# Patient Record
Sex: MALE | Race: WHITE | NOT HISPANIC OR LATINO | URBAN - METROPOLITAN AREA
[De-identification: names, ages, dates, MRNs, and addresses within clinical notes are randomized per-mention and may not be internally consistent; named-entity substitution may affect disease eponyms.]

---

## 2017-12-09 ENCOUNTER — EMERGENCY (EMERGENCY)
Facility: HOSPITAL | Age: 52
LOS: 1 days | Discharge: HOME | End: 2017-12-09
Attending: EMERGENCY MEDICINE | Admitting: EMERGENCY MEDICINE
Payer: COMMERCIAL

## 2017-12-09 VITALS
DIASTOLIC BLOOD PRESSURE: 106 MMHG | TEMPERATURE: 98 F | HEART RATE: 93 BPM | OXYGEN SATURATION: 100 % | SYSTOLIC BLOOD PRESSURE: 146 MMHG | RESPIRATION RATE: 16 BRPM

## 2017-12-09 LAB
ALBUMIN SERPL ELPH-MCNC: 4.4 G/DL — SIGNIFICANT CHANGE UP (ref 3.3–5)
ALP SERPL-CCNC: 67 U/L — SIGNIFICANT CHANGE UP (ref 40–120)
ALT FLD-CCNC: 19 U/L — SIGNIFICANT CHANGE UP (ref 4–41)
AST SERPL-CCNC: 16 U/L — SIGNIFICANT CHANGE UP (ref 4–40)
BASOPHILS # BLD AUTO: 0.11 K/UL — SIGNIFICANT CHANGE UP (ref 0–0.2)
BASOPHILS NFR BLD AUTO: 1.2 % — SIGNIFICANT CHANGE UP (ref 0–2)
BILIRUB SERPL-MCNC: 0.3 MG/DL — SIGNIFICANT CHANGE UP (ref 0.2–1.2)
BUN SERPL-MCNC: 12 MG/DL — SIGNIFICANT CHANGE UP (ref 7–23)
CALCIUM SERPL-MCNC: 9.3 MG/DL — SIGNIFICANT CHANGE UP (ref 8.4–10.5)
CHLORIDE SERPL-SCNC: 102 MMOL/L — SIGNIFICANT CHANGE UP (ref 98–107)
CK MB BLD-MCNC: 1.24 NG/ML — SIGNIFICANT CHANGE UP (ref 1–6.6)
CK MB BLD-MCNC: 1.49 NG/ML — SIGNIFICANT CHANGE UP (ref 1–6.6)
CK MB BLD-MCNC: SIGNIFICANT CHANGE UP (ref 0–2.5)
CK MB BLD-MCNC: SIGNIFICANT CHANGE UP (ref 0–2.5)
CK SERPL-CCNC: 101 U/L — SIGNIFICANT CHANGE UP (ref 30–200)
CK SERPL-CCNC: 114 U/L — SIGNIFICANT CHANGE UP (ref 30–200)
CO2 SERPL-SCNC: 27 MMOL/L — SIGNIFICANT CHANGE UP (ref 22–31)
CREAT SERPL-MCNC: 1.14 MG/DL — SIGNIFICANT CHANGE UP (ref 0.5–1.3)
EOSINOPHIL # BLD AUTO: 0.13 K/UL — SIGNIFICANT CHANGE UP (ref 0–0.5)
EOSINOPHIL NFR BLD AUTO: 1.4 % — SIGNIFICANT CHANGE UP (ref 0–6)
GLUCOSE SERPL-MCNC: 89 MG/DL — SIGNIFICANT CHANGE UP (ref 70–99)
HCT VFR BLD CALC: 42.8 % — SIGNIFICANT CHANGE UP (ref 39–50)
HGB BLD-MCNC: 14.5 G/DL — SIGNIFICANT CHANGE UP (ref 13–17)
IMM GRANULOCYTES # BLD AUTO: 0.02 # — SIGNIFICANT CHANGE UP
IMM GRANULOCYTES NFR BLD AUTO: 0.2 % — SIGNIFICANT CHANGE UP (ref 0–1.5)
LYMPHOCYTES # BLD AUTO: 2.26 K/UL — SIGNIFICANT CHANGE UP (ref 1–3.3)
LYMPHOCYTES # BLD AUTO: 24.1 % — SIGNIFICANT CHANGE UP (ref 13–44)
MCHC RBC-ENTMCNC: 30 PG — SIGNIFICANT CHANGE UP (ref 27–34)
MCHC RBC-ENTMCNC: 33.9 % — SIGNIFICANT CHANGE UP (ref 32–36)
MCV RBC AUTO: 88.6 FL — SIGNIFICANT CHANGE UP (ref 80–100)
MONOCYTES # BLD AUTO: 0.69 K/UL — SIGNIFICANT CHANGE UP (ref 0–0.9)
MONOCYTES NFR BLD AUTO: 7.4 % — SIGNIFICANT CHANGE UP (ref 2–14)
NEUTROPHILS # BLD AUTO: 6.16 K/UL — SIGNIFICANT CHANGE UP (ref 1.8–7.4)
NEUTROPHILS NFR BLD AUTO: 65.7 % — SIGNIFICANT CHANGE UP (ref 43–77)
NRBC # FLD: 0 — SIGNIFICANT CHANGE UP
PLATELET # BLD AUTO: 228 K/UL — SIGNIFICANT CHANGE UP (ref 150–400)
PMV BLD: 10.6 FL — SIGNIFICANT CHANGE UP (ref 7–13)
POTASSIUM SERPL-MCNC: 4.3 MMOL/L — SIGNIFICANT CHANGE UP (ref 3.5–5.3)
POTASSIUM SERPL-SCNC: 4.3 MMOL/L — SIGNIFICANT CHANGE UP (ref 3.5–5.3)
PROT SERPL-MCNC: 6.9 G/DL — SIGNIFICANT CHANGE UP (ref 6–8.3)
RBC # BLD: 4.83 M/UL — SIGNIFICANT CHANGE UP (ref 4.2–5.8)
RBC # FLD: 12.1 % — SIGNIFICANT CHANGE UP (ref 10.3–14.5)
SODIUM SERPL-SCNC: 141 MMOL/L — SIGNIFICANT CHANGE UP (ref 135–145)
TROPONIN T SERPL-MCNC: < 0.06 NG/ML — SIGNIFICANT CHANGE UP (ref 0–0.06)
TROPONIN T SERPL-MCNC: < 0.06 NG/ML — SIGNIFICANT CHANGE UP (ref 0–0.06)
WBC # BLD: 9.37 K/UL — SIGNIFICANT CHANGE UP (ref 3.8–10.5)
WBC # FLD AUTO: 9.37 K/UL — SIGNIFICANT CHANGE UP (ref 3.8–10.5)

## 2017-12-09 PROCEDURE — 71020: CPT | Mod: 26

## 2017-12-09 PROCEDURE — 99220: CPT

## 2017-12-09 RX ORDER — ASPIRIN/CALCIUM CARB/MAGNESIUM 324 MG
162 TABLET ORAL ONCE
Qty: 0 | Refills: 0 | Status: COMPLETED | OUTPATIENT
Start: 2017-12-09 | End: 2017-12-09

## 2017-12-09 RX ORDER — FAMOTIDINE 10 MG/ML
20 INJECTION INTRAVENOUS ONCE
Qty: 0 | Refills: 0 | Status: COMPLETED | OUTPATIENT
Start: 2017-12-09 | End: 2017-12-09

## 2017-12-09 RX ADMIN — Medication 162 MILLIGRAM(S): at 15:01

## 2017-12-09 RX ADMIN — FAMOTIDINE 20 MILLIGRAM(S): 10 INJECTION INTRAVENOUS at 15:01

## 2017-12-09 NOTE — ED CDU PROVIDER INITIAL DAY NOTE - PROGRESS NOTE DETAILS
Pt signed out to me by ЮЛИЯ Washington and Dr. Colbert to follow up CE x 2 and stress in am. PT resting comfortably at bedside, no current complaints. will continue to monitor.

## 2017-12-09 NOTE — ED PROVIDER NOTE - ATTENDING CONTRIBUTION TO CARE
Dr. Arnold: This H&P has been written by myself in its entirety  Dr. Arnold: I have personally performed a face to face bedside history and physical examination of this patient. I have discussed the history, examination, review of systems, assessment and plan of management with the resident. I have reviewed the electronic medical record and amended it to reflect my history, review of systems, physical exam, assessment and plan.

## 2017-12-09 NOTE — ED PROVIDER NOTE - OBJECTIVE STATEMENT
Silvana Crespo M.D: 52yoM hx HTN, HLDintermittent smoker, GERD, p/w 3 days of burning sensation through chest, which today feels sharp in the left side of chest. +mild SOB +burping. thinks it may be his GERD, but he was worried given a significant family history of heart diseasebefore age of 50, decided to get checked out. denies abd pain, n/v/c/d, diaphoresis. Silvana Crespo M.D: 52yoM hx HTN, HLDintermittent smoker, GERD, p/w 3 days of burning sensation through chest, which today feels sharp in the left side of chest. +mild SOB +burping. thinks it may be his GERD, but he was worried given a significant family history of heart diseasebefore age of 50, decided to get checked out. denies abd pain, n/v/c/d, diaphoresis.    Dr. Arnold: 52M h/o HTN, HLD, smoker, GERD, +fam h/o MI (father in 50s) p/w 3 days of intermittent substernal chest pressure, non radiating, mild sob, +burping. does not feel like usual GERD. Last stress 3 years ago, showed ?LVH

## 2017-12-09 NOTE — ED CDU PROVIDER INITIAL DAY NOTE - OBJECTIVE STATEMENT
52 yr old male ex smoker presents with 3 days of constant chest pain.  States takes prilosec for GERD but this is not so much burning.  States burping made it feel better.  Denies SOB, radiation of pain.  Denies fever, cough.  Has significant family hx of father and uncles having MI's in 50s.  No recent cardiac workup.

## 2017-12-09 NOTE — ED PROVIDER NOTE - CONSULTING PHYSICIAN
Dr. Gao - called [phone], [phone] - unable to leave a [facility]

## 2017-12-09 NOTE — ED PROVIDER NOTE - PHYSICAL EXAMINATION
Silvana Crespo M.D.:   patient awake alert seen lying on stretcher NAD .   LUNGS CTAB no wheeze no crackle.   CARD RRR no m/r/g.    Abdomen soft NT ND no rebound no guarding no CVA tenderness.   EXT WWP no edema no calf tenderness CV 2+DP/PT bilaterally.   neuro A&Ox3 gait normal.    skin warm and dry no rash  HEENT: moist mucous membranes, PERRL, EOMI

## 2017-12-09 NOTE — ED PROVIDER NOTE - MEDICAL DECISION MAKING DETAILS
52yoM hx HTN HLD smoker positive family history p/w 3 days intermittent CP. likely GERD related, but given multiple risk factors, cannot rule out cardaic etiology. will check cxr ekg, cardiac labs and send to CDU for observation and further workup. 52yoM hx HTN HLD smoker positive family history p/w 3 days intermittent CP. likely GERD related, but given multiple risk factors, cannot rule out cardiac etiology. will check cxr ekg, cardiac labs and send to CDU for observation and further workup.

## 2017-12-09 NOTE — ED CDU PROVIDER INITIAL DAY NOTE - MEDICAL DECISION MAKING DETAILS
will get second set of Moose and stress test in AM; hx atypical for angina and EKG wnl; pt stable at this time with no complaints.

## 2017-12-09 NOTE — ED ADULT NURSE NOTE - OBJECTIVE STATEMENT
Pt presents to room 19, A&Ox3, ambulatory at baseline without assistance, coming in for evaluation of left sided chest pain, burping, and "gas" x 4 days. Pt has a hx of GERD and HTN. Denies any dizziness, nausea, vomiting, shortness of breath, palpitations, diarrhea, fever, constipation, or chills. has taken multiple antiacid medication with little relief.

## 2017-12-10 VITALS
RESPIRATION RATE: 16 BRPM | DIASTOLIC BLOOD PRESSURE: 84 MMHG | SYSTOLIC BLOOD PRESSURE: 124 MMHG | OXYGEN SATURATION: 100 % | TEMPERATURE: 98 F | HEART RATE: 82 BPM

## 2017-12-10 PROCEDURE — 93016 CV STRESS TEST SUPVJ ONLY: CPT | Mod: GC

## 2017-12-10 PROCEDURE — 93018 CV STRESS TEST I&R ONLY: CPT | Mod: GC

## 2017-12-10 PROCEDURE — 99217: CPT

## 2017-12-10 PROCEDURE — 78452 HT MUSCLE IMAGE SPECT MULT: CPT | Mod: 26

## 2017-12-10 NOTE — ED CDU PROVIDER SUBSEQUENT DAY NOTE - HISTORY
51 y.o male pmhx of GERD here with 3 days of chest pain unrelieved by antacids as it normally is, increased in belching. Pt has had 2 negative Moose, remained asymptomatic awaiting stress in am.

## 2017-12-10 NOTE — ED CDU PROVIDER SUBSEQUENT DAY NOTE - PROGRESS NOTE DETAILS
CDU ЮЛИЯ العلي: Pt signed out to me by ЮЛИЯ Watkins, 52yM w/pmhx HTN, HLD, GERD came to ED with 3 days of chest pain in the CDU for stress, CEx2 and tele, Ptw ith family history of father with MI in his 50s. Pt states his pain is much better but he does still feel an aches in the left chest. Will follow up on stress results. Pt was seen by cardiology who cleared him for discharge home, pt agrees to plan and will follow up with a cardiologist within 1-2 weeks, given cardiology referral list with  contact info

## 2017-12-10 NOTE — ED CDU PROVIDER DISPOSITION NOTE - ATTENDING CONTRIBUTION TO CARE
Dr. Crespo: I have discussed the plan with the PA and agree with the PA documentation unless stated otherwise.

## 2017-12-10 NOTE — PROGRESS NOTE ADULT - SUBJECTIVE AND OBJECTIVE BOX
52yoM hx HTN, HLD, smoker, GERD, p/w 3 days of burning sensation through chest, which today feels sharp in the left side of chest, +burping. He thinks it may be his GERD, but he  was worried given a significant family history of heart disease before age of 50.    Nuclear Stress Test-Exercise     IMPRESSIONS:Abnormal Study  There is a small, moderate defect in proximal to mid  inferior wall that is fixed, suggestive of infarction with  minimal quinn-infarct ischemia.  * Post-stress gated wall motion analysis was performed  (LVEF = 54 %;LVEDV = 72 ml.)    52 Male with abnormal stress test.  Discussed results with Dr. Gomez   Patient can be discharged home.   f/u with Dr. Gomez in 2 weeks    continue ASA, statin, smoking cessation    Pain likely GERD?, continue PPI

## 2017-12-10 NOTE — ED CDU PROVIDER DISPOSITION NOTE - CLINICAL COURSE
pt with family h/o cad, presented to ED with atypical c/p. had neg serial trops. stress test showed ischemia. evaluated by cardiology and d/w dr. sim, who says safe for d/c with outpatient cards f/u. stared him on baby asa daily here. advised no strenuous exercise until seeing cardiology as outpatient.  The patient was given verbal and written discharge instructions. Specifically, instructions when to return to the ED and when to seek follow-up from their pcp was discussed. Any specialty follow-up was discussed, including how to make an appointment.  Instructions were discussed in simple, plain language and was understood by the patient. The patient understands that should their symptoms worsen or any new symptoms arise, they should return to the ED immediately for further evaluation.

## 2017-12-10 NOTE — ED CDU PROVIDER DISPOSITION NOTE - PLAN OF CARE
Follow up with your primary care doctor and cardiologist (referral list provided) within 48-72 hours. You can follow up with , information provided.   Show copies of your reports given to you.   Recommend Aspirin 81mg over the counter daily until further evaluation.    Take all of your other medications as previously prescribed.   Worsening or continued chest pain, shortness of breath, weakness,  or any other concerns return to ED.

## 2017-12-12 ENCOUNTER — APPOINTMENT (OUTPATIENT)
Dept: CARDIOLOGY | Facility: CLINIC | Age: 52
End: 2017-12-12
Payer: COMMERCIAL

## 2017-12-12 ENCOUNTER — NON-APPOINTMENT (OUTPATIENT)
Age: 52
End: 2017-12-12

## 2017-12-12 VITALS
HEART RATE: 76 BPM | BODY MASS INDEX: 29.08 KG/M2 | SYSTOLIC BLOOD PRESSURE: 118 MMHG | WEIGHT: 229 LBS | TEMPERATURE: 98.2 F | OXYGEN SATURATION: 96 % | DIASTOLIC BLOOD PRESSURE: 90 MMHG | HEIGHT: 74.5 IN

## 2017-12-12 VITALS — SYSTOLIC BLOOD PRESSURE: 102 MMHG | DIASTOLIC BLOOD PRESSURE: 82 MMHG

## 2017-12-12 DIAGNOSIS — Z87.01 PERSONAL HISTORY OF PNEUMONIA (RECURRENT): ICD-10-CM

## 2017-12-12 DIAGNOSIS — Z82.3 FAMILY HISTORY OF STROKE: ICD-10-CM

## 2017-12-12 DIAGNOSIS — Z80.0 FAMILY HISTORY OF MALIGNANT NEOPLASM OF DIGESTIVE ORGANS: ICD-10-CM

## 2017-12-12 DIAGNOSIS — Z82.49 FAMILY HISTORY OF ISCHEMIC HEART DISEASE AND OTHER DISEASES OF THE CIRCULATORY SYSTEM: ICD-10-CM

## 2017-12-12 PROCEDURE — 93000 ELECTROCARDIOGRAM COMPLETE: CPT

## 2017-12-12 PROCEDURE — 99245 OFF/OP CONSLTJ NEW/EST HI 55: CPT

## 2017-12-12 RX ORDER — OMEPRAZOLE 40 MG/1
40 CAPSULE, DELAYED RELEASE ORAL
Qty: 60 | Refills: 0 | Status: ACTIVE | COMMUNITY
Start: 2017-06-29

## 2017-12-12 RX ORDER — ACETAMINOPHEN AND CODEINE 300; 30 MG/1; MG/1
300-30 TABLET ORAL
Qty: 25 | Refills: 0 | Status: DISCONTINUED | COMMUNITY
Start: 2017-06-22 | End: 2017-12-12

## 2017-12-12 RX ORDER — ASPIRIN ENTERIC COATED TABLETS 81 MG 81 MG/1
81 TABLET, DELAYED RELEASE ORAL
Qty: 30 | Refills: 5 | Status: ACTIVE | COMMUNITY
Start: 2017-12-12

## 2017-12-18 ENCOUNTER — FORM ENCOUNTER (OUTPATIENT)
Age: 52
End: 2017-12-18

## 2017-12-19 ENCOUNTER — OUTPATIENT (OUTPATIENT)
Dept: OUTPATIENT SERVICES | Facility: HOSPITAL | Age: 52
LOS: 1 days | End: 2017-12-19
Payer: COMMERCIAL

## 2017-12-19 ENCOUNTER — APPOINTMENT (OUTPATIENT)
Dept: CARDIOLOGY | Facility: CLINIC | Age: 52
End: 2017-12-19

## 2017-12-19 ENCOUNTER — APPOINTMENT (OUTPATIENT)
Dept: CARDIOLOGY | Facility: CLINIC | Age: 52
End: 2017-12-19
Payer: COMMERCIAL

## 2017-12-19 DIAGNOSIS — R07.9 CHEST PAIN, UNSPECIFIED: ICD-10-CM

## 2017-12-19 DIAGNOSIS — G47.33 OBSTRUCTIVE SLEEP APNEA (ADULT) (PEDIATRIC): ICD-10-CM

## 2017-12-19 PROCEDURE — 75574 CT ANGIO HRT W/3D IMAGE: CPT

## 2017-12-19 PROCEDURE — 93306 TTE W/DOPPLER COMPLETE: CPT

## 2017-12-19 PROCEDURE — 75574 CT ANGIO HRT W/3D IMAGE: CPT | Mod: 26

## 2017-12-26 ENCOUNTER — APPOINTMENT (OUTPATIENT)
Dept: CARDIOLOGY | Facility: CLINIC | Age: 52
End: 2017-12-26
Payer: COMMERCIAL

## 2017-12-26 VITALS
DIASTOLIC BLOOD PRESSURE: 80 MMHG | WEIGHT: 229 LBS | SYSTOLIC BLOOD PRESSURE: 122 MMHG | HEIGHT: 74.5 IN | BODY MASS INDEX: 29.08 KG/M2

## 2017-12-26 PROCEDURE — 99215 OFFICE O/P EST HI 40 MIN: CPT

## 2017-12-28 ENCOUNTER — OUTPATIENT (OUTPATIENT)
Dept: OUTPATIENT SERVICES | Facility: HOSPITAL | Age: 52
LOS: 1 days | End: 2017-12-28
Payer: COMMERCIAL

## 2017-12-28 VITALS
RESPIRATION RATE: 18 BRPM | HEART RATE: 67 BPM | DIASTOLIC BLOOD PRESSURE: 74 MMHG | SYSTOLIC BLOOD PRESSURE: 121 MMHG | TEMPERATURE: 98 F | OXYGEN SATURATION: 96 %

## 2017-12-28 DIAGNOSIS — I25.9 CHRONIC ISCHEMIC HEART DISEASE, UNSPECIFIED: ICD-10-CM

## 2017-12-28 LAB
ALBUMIN SERPL ELPH-MCNC: 4.3 G/DL — SIGNIFICANT CHANGE UP (ref 3.3–5)
ALP SERPL-CCNC: 73 U/L — SIGNIFICANT CHANGE UP (ref 40–120)
ALT FLD-CCNC: 31 U/L RC — SIGNIFICANT CHANGE UP (ref 10–45)
ANION GAP SERPL CALC-SCNC: 11 MMOL/L — SIGNIFICANT CHANGE UP (ref 5–17)
AST SERPL-CCNC: 18 U/L — SIGNIFICANT CHANGE UP (ref 10–40)
BILIRUB SERPL-MCNC: 0.4 MG/DL — SIGNIFICANT CHANGE UP (ref 0.2–1.2)
BUN SERPL-MCNC: 16 MG/DL — SIGNIFICANT CHANGE UP (ref 7–23)
CALCIUM SERPL-MCNC: 9.7 MG/DL — SIGNIFICANT CHANGE UP (ref 8.4–10.5)
CHLORIDE SERPL-SCNC: 104 MMOL/L — SIGNIFICANT CHANGE UP (ref 96–108)
CO2 SERPL-SCNC: 27 MMOL/L — SIGNIFICANT CHANGE UP (ref 22–31)
CREAT SERPL-MCNC: 1.2 MG/DL — SIGNIFICANT CHANGE UP (ref 0.5–1.3)
GLUCOSE SERPL-MCNC: 105 MG/DL — HIGH (ref 70–99)
HCT VFR BLD CALC: 45.4 % — SIGNIFICANT CHANGE UP (ref 39–50)
HGB BLD-MCNC: 16.1 G/DL — SIGNIFICANT CHANGE UP (ref 13–17)
MCHC RBC-ENTMCNC: 33.1 PG — SIGNIFICANT CHANGE UP (ref 27–34)
MCHC RBC-ENTMCNC: 35.5 GM/DL — SIGNIFICANT CHANGE UP (ref 32–36)
MCV RBC AUTO: 93.1 FL — SIGNIFICANT CHANGE UP (ref 80–100)
PLATELET # BLD AUTO: 228 K/UL — SIGNIFICANT CHANGE UP (ref 150–400)
POTASSIUM SERPL-MCNC: 4.8 MMOL/L — SIGNIFICANT CHANGE UP (ref 3.5–5.3)
POTASSIUM SERPL-SCNC: 4.8 MMOL/L — SIGNIFICANT CHANGE UP (ref 3.5–5.3)
PROT SERPL-MCNC: 7.2 G/DL — SIGNIFICANT CHANGE UP (ref 6–8.3)
RBC # BLD: 4.87 M/UL — SIGNIFICANT CHANGE UP (ref 4.2–5.8)
RBC # FLD: 11.8 % — SIGNIFICANT CHANGE UP (ref 10.3–14.5)
SODIUM SERPL-SCNC: 142 MMOL/L — SIGNIFICANT CHANGE UP (ref 135–145)
WBC # BLD: 8 K/UL — SIGNIFICANT CHANGE UP (ref 3.8–10.5)
WBC # FLD AUTO: 8 K/UL — SIGNIFICANT CHANGE UP (ref 3.8–10.5)

## 2017-12-28 PROCEDURE — C1894: CPT

## 2017-12-28 PROCEDURE — 92978 ENDOLUMINL IVUS OCT C 1ST: CPT | Mod: 26,LM

## 2017-12-28 PROCEDURE — C1887: CPT

## 2017-12-28 PROCEDURE — 85027 COMPLETE CBC AUTOMATED: CPT

## 2017-12-28 PROCEDURE — 93010 ELECTROCARDIOGRAM REPORT: CPT

## 2017-12-28 PROCEDURE — 93005 ELECTROCARDIOGRAM TRACING: CPT

## 2017-12-28 PROCEDURE — 80053 COMPREHEN METABOLIC PANEL: CPT

## 2017-12-28 PROCEDURE — C1753: CPT

## 2017-12-28 PROCEDURE — 92978 ENDOLUMINL IVUS OCT C 1ST: CPT

## 2017-12-28 PROCEDURE — 93458 L HRT ARTERY/VENTRICLE ANGIO: CPT

## 2017-12-28 PROCEDURE — 93458 L HRT ARTERY/VENTRICLE ANGIO: CPT | Mod: 26

## 2017-12-28 PROCEDURE — C1769: CPT

## 2017-12-28 PROCEDURE — 99152 MOD SED SAME PHYS/QHP 5/>YRS: CPT

## 2017-12-28 PROCEDURE — 99153 MOD SED SAME PHYS/QHP EA: CPT

## 2017-12-28 NOTE — H&P CARDIOLOGY - PMH
Gastroesophageal reflux disease, esophagitis presence not specified    HTN (Hypertension)    Hyperlipidemia, unspecified hyperlipidemia type    LOAN (obstructive sleep apnea)

## 2017-12-28 NOTE — H&P CARDIOLOGY - HISTORY OF PRESENT ILLNESS
51 y/o  male, former cigarette smoker x 6PY with PMHx of HTN, HLD, GERD and LOAN - not on CPAP who was evaluated in the ED @ [facility] on 10/9 2/2 c/o 4 days of left sided CP.  Patient describes pain as piercing and states that he noted increased dyspnea when working out those 4 days.  In the ED Cardiac Biomarkers negative x 2 sets, EKG without acute changes.  Patient had Nuclear Stress - report below and was instructed to follow-up with Cardiology - Dr. Reynoso within the week.  He subsequently had CT Heart with Coronaries showing significant atherosclerosis - report below.  Patient is now for [facility] for which he presents today.      Nuclear Stress  MPRESSIONS:Abnormal Study  * There is a small, moderate defect in proximal to mid  inferior wall that is fixed, suggestive of infarction with  minimal quinn-infarct ischemia.  * Post-stress gated wall motion analysis was performed  (LVEF = 54 %;LVEDV = 72 ml.)    CT Heart with Coronaries  IMPRESSION:    1.  Coronary arteries:  LM:  severe (approximately 50%) mixed non-calcified and calcified plaque   distally   LAD:  mild (30-50%) mixed non-calcified and calcified plaque in the   proximal segment; moderate (approximately 50%) mixed non-calcified and   calcified plaque in the mid segment; mild (30-50%) non-calcified plaque   in the diagonal branch  LCX:  mild (30-50%) non-calcified plaque at the ostium; mild (30-50%)   mixed non-calcified and calcified plaque in the OM2 branch  RCA:  severe (>70%) mixed non-calcified and calcified plaque at the   ostium;  minimal (<30%) mixed non-calcified and calcified plaque in the   mid segment; has mild (30-50%) non-calcified plaque in the RPL branch  2.  Moderate coronary artery calcium (Agatston score 320) as delineated   above.  The observed calcium score is at the 95th percentile for age,   gender and race/ethnicity.  3.  Borderline dilated aortic root measuring 40.0 mm at the sinuses of   Valsalva.  4.  Mild to moderate non-calcified and calcified atherosclerotic plaque   in the visualized descending thoracic aorta.

## 2018-01-03 ENCOUNTER — APPOINTMENT (OUTPATIENT)
Dept: CARDIOLOGY | Facility: CLINIC | Age: 53
End: 2018-01-03
Payer: COMMERCIAL

## 2018-01-03 VITALS
HEART RATE: 60 BPM | SYSTOLIC BLOOD PRESSURE: 122 MMHG | TEMPERATURE: 98.4 F | OXYGEN SATURATION: 99 % | HEIGHT: 74.5 IN | DIASTOLIC BLOOD PRESSURE: 80 MMHG | WEIGHT: 227 LBS | BODY MASS INDEX: 28.83 KG/M2

## 2018-01-03 DIAGNOSIS — R07.9 CHEST PAIN, UNSPECIFIED: ICD-10-CM

## 2018-01-03 PROCEDURE — 99215 OFFICE O/P EST HI 40 MIN: CPT

## 2018-01-11 ENCOUNTER — APPOINTMENT (OUTPATIENT)
Dept: CARDIOLOGY | Facility: CLINIC | Age: 53
End: 2018-01-11

## 2018-01-19 ENCOUNTER — APPOINTMENT (OUTPATIENT)
Dept: CARDIOTHORACIC SURGERY | Facility: CLINIC | Age: 53
End: 2018-01-19
Payer: COMMERCIAL

## 2018-01-19 VITALS
SYSTOLIC BLOOD PRESSURE: 129 MMHG | BODY MASS INDEX: 28.95 KG/M2 | TEMPERATURE: 98.1 F | RESPIRATION RATE: 14 BRPM | HEART RATE: 77 BPM | DIASTOLIC BLOOD PRESSURE: 86 MMHG | HEIGHT: 74.5 IN | OXYGEN SATURATION: 95 % | WEIGHT: 228 LBS

## 2018-01-19 VITALS — DIASTOLIC BLOOD PRESSURE: 87 MMHG | SYSTOLIC BLOOD PRESSURE: 125 MMHG

## 2018-01-19 DIAGNOSIS — Z78.9 OTHER SPECIFIED HEALTH STATUS: ICD-10-CM

## 2018-01-19 PROCEDURE — 99205 OFFICE O/P NEW HI 60 MIN: CPT

## 2018-01-24 ENCOUNTER — OUTPATIENT (OUTPATIENT)
Dept: OUTPATIENT SERVICES | Facility: HOSPITAL | Age: 53
LOS: 1 days | End: 2018-01-24
Payer: COMMERCIAL

## 2018-01-24 VITALS
DIASTOLIC BLOOD PRESSURE: 70 MMHG | WEIGHT: 229.72 LBS | TEMPERATURE: 98 F | HEART RATE: 83 BPM | OXYGEN SATURATION: 95 % | SYSTOLIC BLOOD PRESSURE: 109 MMHG | HEIGHT: 74 IN | RESPIRATION RATE: 14 BRPM

## 2018-01-24 DIAGNOSIS — I25.10 ATHEROSCLEROTIC HEART DISEASE OF NATIVE CORONARY ARTERY WITHOUT ANGINA PECTORIS: ICD-10-CM

## 2018-01-24 DIAGNOSIS — G47.33 OBSTRUCTIVE SLEEP APNEA (ADULT) (PEDIATRIC): ICD-10-CM

## 2018-01-24 DIAGNOSIS — Z87.39 PERSONAL HISTORY OF OTHER DISEASES OF THE MUSCULOSKELETAL SYSTEM AND CONNECTIVE TISSUE: Chronic | ICD-10-CM

## 2018-01-24 DIAGNOSIS — Z01.818 ENCOUNTER FOR OTHER PREPROCEDURAL EXAMINATION: ICD-10-CM

## 2018-01-24 DIAGNOSIS — Z98.890 OTHER SPECIFIED POSTPROCEDURAL STATES: Chronic | ICD-10-CM

## 2018-01-24 LAB
BLD GP AB SCN SERPL QL: NEGATIVE — SIGNIFICANT CHANGE UP
HCT VFR BLD CALC: 44.2 % — SIGNIFICANT CHANGE UP (ref 39–50)
HGB BLD-MCNC: 14.7 G/DL — SIGNIFICANT CHANGE UP (ref 13–17)
MCHC RBC-ENTMCNC: 30.3 PG — SIGNIFICANT CHANGE UP (ref 27–34)
MCHC RBC-ENTMCNC: 33.3 GM/DL — SIGNIFICANT CHANGE UP (ref 32–36)
MCV RBC AUTO: 91.1 FL — SIGNIFICANT CHANGE UP (ref 80–100)
PLATELET # BLD AUTO: 232 K/UL — SIGNIFICANT CHANGE UP (ref 150–400)
RBC # BLD: 4.85 M/UL — SIGNIFICANT CHANGE UP (ref 4.2–5.8)
RBC # FLD: 12.9 % — SIGNIFICANT CHANGE UP (ref 10.3–14.5)
RH IG SCN BLD-IMP: POSITIVE — SIGNIFICANT CHANGE UP
WBC # BLD: 8.24 K/UL — SIGNIFICANT CHANGE UP (ref 3.8–10.5)
WBC # FLD AUTO: 8.24 K/UL — SIGNIFICANT CHANGE UP (ref 3.8–10.5)

## 2018-01-24 PROCEDURE — 86901 BLOOD TYPING SEROLOGIC RH(D): CPT

## 2018-01-24 PROCEDURE — 80048 BASIC METABOLIC PNL TOTAL CA: CPT

## 2018-01-24 PROCEDURE — 83036 HEMOGLOBIN GLYCOSYLATED A1C: CPT

## 2018-01-24 PROCEDURE — 86900 BLOOD TYPING SEROLOGIC ABO: CPT

## 2018-01-24 PROCEDURE — 93005 ELECTROCARDIOGRAM TRACING: CPT

## 2018-01-24 PROCEDURE — 93010 ELECTROCARDIOGRAM REPORT: CPT

## 2018-01-24 PROCEDURE — G0463: CPT

## 2018-01-24 PROCEDURE — 87641 MR-STAPH DNA AMP PROBE: CPT

## 2018-01-24 PROCEDURE — 86850 RBC ANTIBODY SCREEN: CPT

## 2018-01-24 PROCEDURE — 87640 STAPH A DNA AMP PROBE: CPT

## 2018-01-24 PROCEDURE — 85027 COMPLETE CBC AUTOMATED: CPT

## 2018-01-24 RX ORDER — ATORVASTATIN CALCIUM 80 MG/1
1 TABLET, FILM COATED ORAL
Qty: 0 | Refills: 0 | COMMUNITY

## 2018-01-24 NOTE — H&P PST ADULT - ACTIVITY
ADLs, noderate housework, activity now somewhat limited per surgeon's instructions secondary to findings on cardiac cath

## 2018-01-24 NOTE — H&P PST ADULT - PMH
BPH (benign prostatic hyperplasia)    CAD (coronary artery disease)    Gastroesophageal reflux disease, esophagitis presence not specified    HTN (Hypertension)    Hyperlipidemia, unspecified hyperlipidemia type    LOAN (obstructive sleep apnea)  noncompliant with dental device BPH (benign prostatic hyperplasia)    CAD (coronary artery disease)    Gastroesophageal reflux disease, esophagitis presence not specified    HTN (Hypertension)    Hyperlipidemia, unspecified hyperlipidemia type    LOAN (obstructive sleep apnea)  noncompliant with dental device  PFO (patent foramen ovale)  per CT Heart with coronaries 12/19/2017 - "there is a patent foramen ovale with a small volume of left to right flow of contrast agent."

## 2018-01-24 NOTE — H&P PST ADULT - PSH
H/O colonoscopy    H/O endoscopy    H/O umbilical hernia repair    History of Appendectomy    History of trigger finger  right middle finger - s/p surgical repair

## 2018-01-24 NOTE — H&P PST ADULT - HISTORY OF PRESENT ILLNESS
53 yo male with h/o HTN, HLD, LOAN and GERD with c/o mid-sternal pain x 3 days and belching 12/2017. Pt was seen in ED and underwent nuclear stress test, which revealed fixed inferior wall defect. Cardiac cath revealed left main and RCA coronary stenosis. IVUS confirmed stenosis of mid-distal left main. Pt is scheduled for CABG x 3 on 1/31/2018.

## 2018-01-25 LAB
ANION GAP SERPL CALC-SCNC: 16 MMOL/L — SIGNIFICANT CHANGE UP (ref 5–17)
BUN SERPL-MCNC: 15 MG/DL — SIGNIFICANT CHANGE UP (ref 7–23)
CALCIUM SERPL-MCNC: 10 MG/DL — SIGNIFICANT CHANGE UP (ref 8.4–10.5)
CHLORIDE SERPL-SCNC: 102 MMOL/L — SIGNIFICANT CHANGE UP (ref 96–108)
CO2 SERPL-SCNC: 24 MMOL/L — SIGNIFICANT CHANGE UP (ref 22–31)
CREAT SERPL-MCNC: 1.23 MG/DL — SIGNIFICANT CHANGE UP (ref 0.5–1.3)
GLUCOSE SERPL-MCNC: 102 MG/DL — HIGH (ref 70–99)
HBA1C BLD-MCNC: 5.8 % — HIGH (ref 4–5.6)
MRSA PCR RESULT.: SIGNIFICANT CHANGE UP
POTASSIUM SERPL-MCNC: 4.8 MMOL/L — SIGNIFICANT CHANGE UP (ref 3.5–5.3)
POTASSIUM SERPL-SCNC: 4.8 MMOL/L — SIGNIFICANT CHANGE UP (ref 3.5–5.3)
S AUREUS DNA NOSE QL NAA+PROBE: SIGNIFICANT CHANGE UP
SODIUM SERPL-SCNC: 142 MMOL/L — SIGNIFICANT CHANGE UP (ref 135–145)

## 2018-01-31 ENCOUNTER — APPOINTMENT (OUTPATIENT)
Dept: CARDIOTHORACIC SURGERY | Facility: HOSPITAL | Age: 53
End: 2018-01-31

## 2018-01-31 ENCOUNTER — INPATIENT (INPATIENT)
Facility: HOSPITAL | Age: 53
LOS: 4 days | Discharge: HOME | DRG: 236 | End: 2018-02-05
Attending: THORACIC SURGERY (CARDIOTHORACIC VASCULAR SURGERY) | Admitting: THORACIC SURGERY (CARDIOTHORACIC VASCULAR SURGERY)
Payer: COMMERCIAL

## 2018-01-31 VITALS
WEIGHT: 226.41 LBS | OXYGEN SATURATION: 98 % | HEART RATE: 90 BPM | RESPIRATION RATE: 16 BRPM | TEMPERATURE: 98 F | SYSTOLIC BLOOD PRESSURE: 130 MMHG | HEIGHT: 74 IN | DIASTOLIC BLOOD PRESSURE: 87 MMHG

## 2018-01-31 DIAGNOSIS — Z98.890 OTHER SPECIFIED POSTPROCEDURAL STATES: Chronic | ICD-10-CM

## 2018-01-31 DIAGNOSIS — I25.10 ATHEROSCLEROTIC HEART DISEASE OF NATIVE CORONARY ARTERY WITHOUT ANGINA PECTORIS: ICD-10-CM

## 2018-01-31 DIAGNOSIS — Z87.39 PERSONAL HISTORY OF OTHER DISEASES OF THE MUSCULOSKELETAL SYSTEM AND CONNECTIVE TISSUE: Chronic | ICD-10-CM

## 2018-01-31 LAB
ALBUMIN SERPL ELPH-MCNC: 4 G/DL — SIGNIFICANT CHANGE UP (ref 3.3–5)
ALP SERPL-CCNC: 53 U/L — SIGNIFICANT CHANGE UP (ref 40–120)
ALT FLD-CCNC: 21 U/L RC — SIGNIFICANT CHANGE UP (ref 10–45)
ANION GAP SERPL CALC-SCNC: 13 MMOL/L — SIGNIFICANT CHANGE UP (ref 5–17)
APTT BLD: 31 SEC — SIGNIFICANT CHANGE UP (ref 27.5–37.4)
AST SERPL-CCNC: 24 U/L — SIGNIFICANT CHANGE UP (ref 10–40)
BILIRUB SERPL-MCNC: 1 MG/DL — SIGNIFICANT CHANGE UP (ref 0.2–1.2)
BUN SERPL-MCNC: 16 MG/DL — SIGNIFICANT CHANGE UP (ref 7–23)
CALCIUM SERPL-MCNC: 8.8 MG/DL — SIGNIFICANT CHANGE UP (ref 8.4–10.5)
CHLORIDE SERPL-SCNC: 107 MMOL/L — SIGNIFICANT CHANGE UP (ref 96–108)
CK MB BLD-MCNC: 5.1 % — HIGH (ref 0–3.5)
CK MB CFR SERPL CALC: 16.8 NG/ML — HIGH (ref 0–6.7)
CK SERPL-CCNC: 329 U/L — HIGH (ref 30–200)
CO2 SERPL-SCNC: 23 MMOL/L — SIGNIFICANT CHANGE UP (ref 22–31)
CREAT SERPL-MCNC: 1.31 MG/DL — HIGH (ref 0.5–1.3)
FIBRINOGEN PPP-MCNC: 322 MG/DL — SIGNIFICANT CHANGE UP (ref 310–510)
GAS PNL BLDA: SIGNIFICANT CHANGE UP
GLUCOSE BLDC GLUCOMTR-MCNC: 106 MG/DL — HIGH (ref 70–99)
GLUCOSE BLDC GLUCOMTR-MCNC: 149 MG/DL — HIGH (ref 70–99)
GLUCOSE BLDC GLUCOMTR-MCNC: 161 MG/DL — HIGH (ref 70–99)
GLUCOSE BLDC GLUCOMTR-MCNC: 173 MG/DL — HIGH (ref 70–99)
GLUCOSE SERPL-MCNC: 125 MG/DL — HIGH (ref 70–99)
HCT VFR BLD CALC: 37.7 % — LOW (ref 39–50)
HGB BLD-MCNC: 13.5 G/DL — SIGNIFICANT CHANGE UP (ref 13–17)
INR BLD: 1.22 RATIO — HIGH (ref 0.88–1.16)
MCHC RBC-ENTMCNC: 32.6 PG — SIGNIFICANT CHANGE UP (ref 27–34)
MCHC RBC-ENTMCNC: 35.8 GM/DL — SIGNIFICANT CHANGE UP (ref 32–36)
MCV RBC AUTO: 91 FL — SIGNIFICANT CHANGE UP (ref 80–100)
PLATELET # BLD AUTO: 133 K/UL — LOW (ref 150–400)
POTASSIUM SERPL-MCNC: 4.1 MMOL/L — SIGNIFICANT CHANGE UP (ref 3.5–5.3)
POTASSIUM SERPL-SCNC: 4.1 MMOL/L — SIGNIFICANT CHANGE UP (ref 3.5–5.3)
PROT SERPL-MCNC: 6.1 G/DL — SIGNIFICANT CHANGE UP (ref 6–8.3)
PROTHROM AB SERPL-ACNC: 13.2 SEC — HIGH (ref 9.8–12.7)
RBC # BLD: 4.14 M/UL — LOW (ref 4.2–5.8)
RBC # FLD: 11.6 % — SIGNIFICANT CHANGE UP (ref 10.3–14.5)
RH IG SCN BLD-IMP: POSITIVE — SIGNIFICANT CHANGE UP
SODIUM SERPL-SCNC: 143 MMOL/L — SIGNIFICANT CHANGE UP (ref 135–145)
TROPONIN T SERPL-MCNC: 0.33 NG/ML — HIGH (ref 0–0.06)
WBC # BLD: 14.8 K/UL — HIGH (ref 3.8–10.5)
WBC # FLD AUTO: 14.8 K/UL — HIGH (ref 3.8–10.5)

## 2018-01-31 PROCEDURE — 71045 X-RAY EXAM CHEST 1 VIEW: CPT | Mod: 26

## 2018-01-31 PROCEDURE — 93010 ELECTROCARDIOGRAM REPORT: CPT

## 2018-01-31 PROCEDURE — 33533 CABG ARTERIAL SINGLE: CPT

## 2018-01-31 PROCEDURE — 33508 ENDOSCOPIC VEIN HARVEST: CPT

## 2018-01-31 PROCEDURE — 33518 CABG ARTERY-VEIN TWO: CPT

## 2018-01-31 RX ORDER — ATORVASTATIN CALCIUM 80 MG/1
1 TABLET, FILM COATED ORAL
Qty: 0 | Refills: 0 | COMMUNITY

## 2018-01-31 RX ORDER — HYDROMORPHONE HYDROCHLORIDE 2 MG/ML
0.5 INJECTION INTRAMUSCULAR; INTRAVENOUS; SUBCUTANEOUS ONCE
Qty: 0 | Refills: 0 | Status: DISCONTINUED | OUTPATIENT
Start: 2018-01-31 | End: 2018-01-31

## 2018-01-31 RX ORDER — INSULIN HUMAN 100 [IU]/ML
1 INJECTION, SOLUTION SUBCUTANEOUS
Qty: 100 | Refills: 0 | Status: DISCONTINUED | OUTPATIENT
Start: 2018-01-31 | End: 2018-02-01

## 2018-01-31 RX ORDER — MEPERIDINE HYDROCHLORIDE 50 MG/ML
25 INJECTION INTRAMUSCULAR; INTRAVENOUS; SUBCUTANEOUS ONCE
Qty: 0 | Refills: 0 | Status: DISCONTINUED | OUTPATIENT
Start: 2018-01-31 | End: 2018-02-01

## 2018-01-31 RX ORDER — NICARDIPINE HYDROCHLORIDE 30 MG/1
3 CAPSULE, EXTENDED RELEASE ORAL
Qty: 40 | Refills: 0 | Status: DISCONTINUED | OUTPATIENT
Start: 2018-01-31 | End: 2018-02-01

## 2018-01-31 RX ORDER — ASPIRIN/CALCIUM CARB/MAGNESIUM 324 MG
1 TABLET ORAL
Qty: 0 | Refills: 0 | COMMUNITY

## 2018-01-31 RX ORDER — POLYETHYLENE GLYCOL 3350 17 G/17G
17 POWDER, FOR SOLUTION ORAL DAILY
Qty: 0 | Refills: 0 | Status: DISCONTINUED | OUTPATIENT
Start: 2018-02-01 | End: 2018-02-05

## 2018-01-31 RX ORDER — INSULIN HUMAN 100 [IU]/ML
4 INJECTION, SOLUTION SUBCUTANEOUS ONCE
Qty: 0 | Refills: 0 | Status: COMPLETED | OUTPATIENT
Start: 2018-01-31 | End: 2018-01-31

## 2018-01-31 RX ORDER — POTASSIUM CHLORIDE 20 MEQ
10 PACKET (EA) ORAL
Qty: 0 | Refills: 0 | Status: COMPLETED | OUTPATIENT
Start: 2018-01-31 | End: 2018-01-31

## 2018-01-31 RX ORDER — LIDOCAINE HCL 20 MG/ML
0.2 VIAL (ML) INJECTION ONCE
Qty: 0 | Refills: 0 | Status: DISCONTINUED | OUTPATIENT
Start: 2018-01-31 | End: 2018-01-31

## 2018-01-31 RX ORDER — ACETAMINOPHEN 500 MG
1000 TABLET ORAL ONCE
Qty: 0 | Refills: 0 | Status: COMPLETED | OUTPATIENT
Start: 2018-01-31 | End: 2018-01-31

## 2018-01-31 RX ORDER — NICARDIPINE HYDROCHLORIDE 30 MG/1
1 CAPSULE, EXTENDED RELEASE ORAL
Qty: 40 | Refills: 0 | Status: DISCONTINUED | OUTPATIENT
Start: 2018-01-31 | End: 2018-01-31

## 2018-01-31 RX ORDER — ASPIRIN/CALCIUM CARB/MAGNESIUM 324 MG
300 TABLET ORAL ONCE
Qty: 0 | Refills: 0 | Status: DISCONTINUED | OUTPATIENT
Start: 2018-01-31 | End: 2018-02-01

## 2018-01-31 RX ORDER — CEFUROXIME AXETIL 250 MG
1500 TABLET ORAL EVERY 8 HOURS
Qty: 0 | Refills: 0 | Status: COMPLETED | OUTPATIENT
Start: 2018-01-31 | End: 2018-02-02

## 2018-01-31 RX ORDER — SODIUM CHLORIDE 9 MG/ML
1000 INJECTION INTRAMUSCULAR; INTRAVENOUS; SUBCUTANEOUS
Qty: 0 | Refills: 0 | Status: DISCONTINUED | OUTPATIENT
Start: 2018-01-31 | End: 2018-02-05

## 2018-01-31 RX ORDER — POTASSIUM CHLORIDE 20 MEQ
10 PACKET (EA) ORAL ONCE
Qty: 0 | Refills: 0 | Status: COMPLETED | OUTPATIENT
Start: 2018-01-31 | End: 2018-01-31

## 2018-01-31 RX ORDER — NOREPINEPHRINE BITARTRATE/D5W 8 MG/250ML
0.04 PLASTIC BAG, INJECTION (ML) INTRAVENOUS
Qty: 8 | Refills: 0 | Status: DISCONTINUED | OUTPATIENT
Start: 2018-01-31 | End: 2018-02-01

## 2018-01-31 RX ORDER — ASPIRIN/CALCIUM CARB/MAGNESIUM 324 MG
325 TABLET ORAL DAILY
Qty: 0 | Refills: 0 | Status: DISCONTINUED | OUTPATIENT
Start: 2018-01-31 | End: 2018-02-05

## 2018-01-31 RX ORDER — CEFUROXIME AXETIL 250 MG
1500 TABLET ORAL ONCE
Qty: 0 | Refills: 0 | Status: COMPLETED | OUTPATIENT
Start: 2018-01-31 | End: 2018-01-31

## 2018-01-31 RX ORDER — ALBUMIN HUMAN 25 %
250 VIAL (ML) INTRAVENOUS
Qty: 0 | Refills: 0 | Status: COMPLETED | OUTPATIENT
Start: 2018-01-31 | End: 2018-01-31

## 2018-01-31 RX ORDER — SODIUM CHLORIDE 9 MG/ML
3 INJECTION INTRAMUSCULAR; INTRAVENOUS; SUBCUTANEOUS EVERY 8 HOURS
Qty: 0 | Refills: 0 | Status: DISCONTINUED | OUTPATIENT
Start: 2018-01-31 | End: 2018-01-31

## 2018-01-31 RX ORDER — ACETAMINOPHEN 500 MG
650 TABLET ORAL EVERY 6 HOURS
Qty: 0 | Refills: 0 | Status: DISCONTINUED | OUTPATIENT
Start: 2018-02-01 | End: 2018-02-01

## 2018-01-31 RX ORDER — METOCLOPRAMIDE HCL 10 MG
10 TABLET ORAL EVERY 8 HOURS
Qty: 0 | Refills: 0 | Status: COMPLETED | OUTPATIENT
Start: 2018-01-31 | End: 2018-02-02

## 2018-01-31 RX ORDER — OXYCODONE HYDROCHLORIDE 5 MG/1
5 TABLET ORAL EVERY 4 HOURS
Qty: 0 | Refills: 0 | Status: DISCONTINUED | OUTPATIENT
Start: 2018-02-01 | End: 2018-02-05

## 2018-01-31 RX ORDER — PANTOPRAZOLE SODIUM 20 MG/1
40 TABLET, DELAYED RELEASE ORAL DAILY
Qty: 0 | Refills: 0 | Status: DISCONTINUED | OUTPATIENT
Start: 2018-01-31 | End: 2018-02-02

## 2018-01-31 RX ORDER — DOCUSATE SODIUM 100 MG
100 CAPSULE ORAL THREE TIMES A DAY
Qty: 0 | Refills: 0 | Status: DISCONTINUED | OUTPATIENT
Start: 2018-01-31 | End: 2018-02-05

## 2018-01-31 RX ORDER — INSULIN HUMAN 100 [IU]/ML
0.5 INJECTION, SOLUTION SUBCUTANEOUS ONCE
Qty: 0 | Refills: 0 | Status: DISCONTINUED | OUTPATIENT
Start: 2018-01-31 | End: 2018-01-31

## 2018-01-31 RX ADMIN — HYDROMORPHONE HYDROCHLORIDE 0.5 MILLIGRAM(S): 2 INJECTION INTRAMUSCULAR; INTRAVENOUS; SUBCUTANEOUS at 21:55

## 2018-01-31 RX ADMIN — Medication 125 MILLILITER(S): at 19:10

## 2018-01-31 RX ADMIN — Medication 100 MILLIGRAM(S): at 17:11

## 2018-01-31 RX ADMIN — Medication 1000 MILLIGRAM(S): at 20:35

## 2018-01-31 RX ADMIN — HYDROMORPHONE HYDROCHLORIDE 0.5 MILLIGRAM(S): 2 INJECTION INTRAMUSCULAR; INTRAVENOUS; SUBCUTANEOUS at 17:30

## 2018-01-31 RX ADMIN — Medication 10 MILLIGRAM(S): at 21:55

## 2018-01-31 RX ADMIN — Medication 400 MILLIGRAM(S): at 20:05

## 2018-01-31 RX ADMIN — HYDROMORPHONE HYDROCHLORIDE 0.5 MILLIGRAM(S): 2 INJECTION INTRAMUSCULAR; INTRAVENOUS; SUBCUTANEOUS at 22:25

## 2018-01-31 RX ADMIN — Medication 50 MILLIEQUIVALENT(S): at 18:35

## 2018-01-31 RX ADMIN — HYDROMORPHONE HYDROCHLORIDE 0.5 MILLIGRAM(S): 2 INJECTION INTRAMUSCULAR; INTRAVENOUS; SUBCUTANEOUS at 17:45

## 2018-01-31 RX ADMIN — Medication 325 MILLIGRAM(S): at 21:55

## 2018-01-31 RX ADMIN — Medication 10 MILLIGRAM(S): at 16:16

## 2018-01-31 RX ADMIN — Medication 100 MILLIGRAM(S): at 21:55

## 2018-01-31 RX ADMIN — Medication 125 MILLILITER(S): at 18:36

## 2018-01-31 RX ADMIN — Medication 50 MILLIEQUIVALENT(S): at 19:09

## 2018-01-31 RX ADMIN — PANTOPRAZOLE SODIUM 40 MILLIGRAM(S): 20 TABLET, DELAYED RELEASE ORAL at 16:16

## 2018-01-31 RX ADMIN — Medication 50 MILLIEQUIVALENT(S): at 17:29

## 2018-01-31 RX ADMIN — INSULIN HUMAN 4 UNIT(S): 100 INJECTION, SOLUTION SUBCUTANEOUS at 18:53

## 2018-01-31 NOTE — BRIEF OPERATIVE NOTE - PROCEDURE
<<-----Click on this checkbox to enter Procedure Coronary artery bypass grafting  01/31/2018  CABG X 3 (LIMA->LAD, SVG->Hafsa, SVG->Cx)  Active  [facility] Coronary artery bypass grafting  01/31/2018  CABG X 3 (LIMA->LAD, SVG->Hafsa, SVG->RCA)  Active  Sheila Gaitan

## 2018-01-31 NOTE — AIRWAY REMOVAL NOTE  ADULT & PEDS - ARTIFICAL AIRWAY REMOVAL COMMENTS
Written order for extubation verified. The patient was identified by full name and birth date compared to the identification band. Present during the procedure was  Vidhi GENAO

## 2018-02-01 DIAGNOSIS — K21.9 GASTRO-ESOPHAGEAL REFLUX DISEASE WITHOUT ESOPHAGITIS: ICD-10-CM

## 2018-02-01 DIAGNOSIS — Z95.1 PRESENCE OF AORTOCORONARY BYPASS GRAFT: ICD-10-CM

## 2018-02-01 DIAGNOSIS — E78.5 HYPERLIPIDEMIA, UNSPECIFIED: ICD-10-CM

## 2018-02-01 DIAGNOSIS — R52 PAIN, UNSPECIFIED: ICD-10-CM

## 2018-02-01 LAB
ALBUMIN SERPL ELPH-MCNC: 4.3 G/DL — SIGNIFICANT CHANGE UP (ref 3.3–5)
ALP SERPL-CCNC: 46 U/L — SIGNIFICANT CHANGE UP (ref 40–120)
ALT FLD-CCNC: 21 U/L RC — SIGNIFICANT CHANGE UP (ref 10–45)
ANION GAP SERPL CALC-SCNC: 11 MMOL/L — SIGNIFICANT CHANGE UP (ref 5–17)
APTT BLD: 25.3 SEC — LOW (ref 27.5–37.4)
AST SERPL-CCNC: 27 U/L — SIGNIFICANT CHANGE UP (ref 10–40)
BILIRUB SERPL-MCNC: 0.7 MG/DL — SIGNIFICANT CHANGE UP (ref 0.2–1.2)
BUN SERPL-MCNC: 20 MG/DL — SIGNIFICANT CHANGE UP (ref 7–23)
CALCIUM SERPL-MCNC: 8.8 MG/DL — SIGNIFICANT CHANGE UP (ref 8.4–10.5)
CHLORIDE SERPL-SCNC: 104 MMOL/L — SIGNIFICANT CHANGE UP (ref 96–108)
CO2 SERPL-SCNC: 25 MMOL/L — SIGNIFICANT CHANGE UP (ref 22–31)
CREAT SERPL-MCNC: 1.21 MG/DL — SIGNIFICANT CHANGE UP (ref 0.5–1.3)
GAS PNL BLDA: SIGNIFICANT CHANGE UP
GLUCOSE BLDC GLUCOMTR-MCNC: 119 MG/DL — HIGH (ref 70–99)
GLUCOSE BLDC GLUCOMTR-MCNC: 123 MG/DL — HIGH (ref 70–99)
GLUCOSE BLDC GLUCOMTR-MCNC: 125 MG/DL — HIGH (ref 70–99)
GLUCOSE BLDC GLUCOMTR-MCNC: 126 MG/DL — HIGH (ref 70–99)
GLUCOSE BLDC GLUCOMTR-MCNC: 128 MG/DL — HIGH (ref 70–99)
GLUCOSE BLDC GLUCOMTR-MCNC: 131 MG/DL — HIGH (ref 70–99)
GLUCOSE BLDC GLUCOMTR-MCNC: 136 MG/DL — HIGH (ref 70–99)
GLUCOSE BLDC GLUCOMTR-MCNC: 139 MG/DL — HIGH (ref 70–99)
GLUCOSE BLDC GLUCOMTR-MCNC: 146 MG/DL — HIGH (ref 70–99)
GLUCOSE BLDC GLUCOMTR-MCNC: 161 MG/DL — HIGH (ref 70–99)
GLUCOSE BLDC GLUCOMTR-MCNC: 168 MG/DL — HIGH (ref 70–99)
GLUCOSE SERPL-MCNC: 142 MG/DL — HIGH (ref 70–99)
HCT VFR BLD CALC: 33.9 % — LOW (ref 39–50)
HGB BLD-MCNC: 12.2 G/DL — LOW (ref 13–17)
INR BLD: 1.33 RATIO — HIGH (ref 0.88–1.16)
MCHC RBC-ENTMCNC: 32.6 PG — SIGNIFICANT CHANGE UP (ref 27–34)
MCHC RBC-ENTMCNC: 35.9 GM/DL — SIGNIFICANT CHANGE UP (ref 32–36)
MCV RBC AUTO: 90.8 FL — SIGNIFICANT CHANGE UP (ref 80–100)
PLATELET # BLD AUTO: 150 K/UL — SIGNIFICANT CHANGE UP (ref 150–400)
POTASSIUM SERPL-MCNC: 4.1 MMOL/L — SIGNIFICANT CHANGE UP (ref 3.5–5.3)
POTASSIUM SERPL-SCNC: 4.1 MMOL/L — SIGNIFICANT CHANGE UP (ref 3.5–5.3)
PROT SERPL-MCNC: 6.5 G/DL — SIGNIFICANT CHANGE UP (ref 6–8.3)
PROTHROM AB SERPL-ACNC: 14.5 SEC — HIGH (ref 9.8–12.7)
RBC # BLD: 3.74 M/UL — LOW (ref 4.2–5.8)
RBC # FLD: 11.6 % — SIGNIFICANT CHANGE UP (ref 10.3–14.5)
SODIUM SERPL-SCNC: 140 MMOL/L — SIGNIFICANT CHANGE UP (ref 135–145)
WBC # BLD: 13 K/UL — HIGH (ref 3.8–10.5)
WBC # FLD AUTO: 13 K/UL — HIGH (ref 3.8–10.5)

## 2018-02-01 PROCEDURE — 93010 ELECTROCARDIOGRAM REPORT: CPT

## 2018-02-01 PROCEDURE — 71045 X-RAY EXAM CHEST 1 VIEW: CPT | Mod: 26

## 2018-02-01 PROCEDURE — 99233 SBSQ HOSP IP/OBS HIGH 50: CPT

## 2018-02-01 PROCEDURE — 99232 SBSQ HOSP IP/OBS MODERATE 35: CPT

## 2018-02-01 RX ORDER — ATORVASTATIN CALCIUM 80 MG/1
80 TABLET, FILM COATED ORAL AT BEDTIME
Qty: 0 | Refills: 0 | Status: DISCONTINUED | OUTPATIENT
Start: 2018-02-01 | End: 2018-02-05

## 2018-02-01 RX ORDER — HYDROMORPHONE HYDROCHLORIDE 2 MG/ML
0.5 INJECTION INTRAMUSCULAR; INTRAVENOUS; SUBCUTANEOUS ONCE
Qty: 0 | Refills: 0 | Status: DISCONTINUED | OUTPATIENT
Start: 2018-02-01 | End: 2018-02-01

## 2018-02-01 RX ORDER — INSULIN LISPRO 100/ML
VIAL (ML) SUBCUTANEOUS
Qty: 0 | Refills: 0 | Status: DISCONTINUED | OUTPATIENT
Start: 2018-02-01 | End: 2018-02-05

## 2018-02-01 RX ORDER — ACETAMINOPHEN 500 MG
975 TABLET ORAL EVERY 8 HOURS
Qty: 0 | Refills: 0 | Status: DISCONTINUED | OUTPATIENT
Start: 2018-02-01 | End: 2018-02-05

## 2018-02-01 RX ORDER — OXYCODONE HYDROCHLORIDE 5 MG/1
10 TABLET ORAL EVERY 4 HOURS
Qty: 0 | Refills: 0 | Status: DISCONTINUED | OUTPATIENT
Start: 2018-02-01 | End: 2018-02-05

## 2018-02-01 RX ORDER — POTASSIUM CHLORIDE 20 MEQ
10 PACKET (EA) ORAL
Qty: 0 | Refills: 0 | Status: COMPLETED | OUTPATIENT
Start: 2018-02-01 | End: 2018-02-01

## 2018-02-01 RX ORDER — SODIUM CHLORIDE 9 MG/ML
3 INJECTION INTRAMUSCULAR; INTRAVENOUS; SUBCUTANEOUS EVERY 8 HOURS
Qty: 0 | Refills: 0 | Status: DISCONTINUED | OUTPATIENT
Start: 2018-02-01 | End: 2018-02-05

## 2018-02-01 RX ORDER — METOPROLOL TARTRATE 50 MG
25 TABLET ORAL EVERY 12 HOURS
Qty: 0 | Refills: 0 | Status: DISCONTINUED | OUTPATIENT
Start: 2018-02-01 | End: 2018-02-01

## 2018-02-01 RX ORDER — ONDANSETRON 8 MG/1
4 TABLET, FILM COATED ORAL ONCE
Qty: 0 | Refills: 0 | Status: COMPLETED | OUTPATIENT
Start: 2018-02-01 | End: 2018-02-01

## 2018-02-01 RX ORDER — HYDROMORPHONE HYDROCHLORIDE 2 MG/ML
1 INJECTION INTRAMUSCULAR; INTRAVENOUS; SUBCUTANEOUS ONCE
Qty: 0 | Refills: 0 | Status: DISCONTINUED | OUTPATIENT
Start: 2018-02-01 | End: 2018-02-01

## 2018-02-01 RX ORDER — ENOXAPARIN SODIUM 100 MG/ML
40 INJECTION SUBCUTANEOUS DAILY
Qty: 0 | Refills: 0 | Status: DISCONTINUED | OUTPATIENT
Start: 2018-02-01 | End: 2018-02-05

## 2018-02-01 RX ORDER — METOPROLOL TARTRATE 50 MG
25 TABLET ORAL
Qty: 0 | Refills: 0 | Status: DISCONTINUED | OUTPATIENT
Start: 2018-02-01 | End: 2018-02-02

## 2018-02-01 RX ADMIN — Medication 100 MILLIGRAM(S): at 02:09

## 2018-02-01 RX ADMIN — Medication 975 MILLIGRAM(S): at 12:19

## 2018-02-01 RX ADMIN — Medication 975 MILLIGRAM(S): at 13:10

## 2018-02-01 RX ADMIN — Medication 50 MILLIEQUIVALENT(S): at 07:14

## 2018-02-01 RX ADMIN — Medication 10 MILLIGRAM(S): at 13:06

## 2018-02-01 RX ADMIN — ENOXAPARIN SODIUM 40 MILLIGRAM(S): 100 INJECTION SUBCUTANEOUS at 13:12

## 2018-02-01 RX ADMIN — HYDROMORPHONE HYDROCHLORIDE 1 MILLIGRAM(S): 2 INJECTION INTRAMUSCULAR; INTRAVENOUS; SUBCUTANEOUS at 08:15

## 2018-02-01 RX ADMIN — HYDROMORPHONE HYDROCHLORIDE 0.5 MILLIGRAM(S): 2 INJECTION INTRAMUSCULAR; INTRAVENOUS; SUBCUTANEOUS at 10:47

## 2018-02-01 RX ADMIN — OXYCODONE HYDROCHLORIDE 10 MILLIGRAM(S): 5 TABLET ORAL at 18:08

## 2018-02-01 RX ADMIN — SODIUM CHLORIDE 3 MILLILITER(S): 9 INJECTION INTRAMUSCULAR; INTRAVENOUS; SUBCUTANEOUS at 23:29

## 2018-02-01 RX ADMIN — Medication 100 MILLIGRAM(S): at 09:33

## 2018-02-01 RX ADMIN — Medication 10 MILLIGRAM(S): at 06:28

## 2018-02-01 RX ADMIN — Medication 100 MILLIGRAM(S): at 13:05

## 2018-02-01 RX ADMIN — OXYCODONE HYDROCHLORIDE 10 MILLIGRAM(S): 5 TABLET ORAL at 22:12

## 2018-02-01 RX ADMIN — OXYCODONE HYDROCHLORIDE 10 MILLIGRAM(S): 5 TABLET ORAL at 22:45

## 2018-02-01 RX ADMIN — Medication 25 MILLIGRAM(S): at 18:08

## 2018-02-01 RX ADMIN — Medication 25 MILLIGRAM(S): at 06:28

## 2018-02-01 RX ADMIN — SODIUM CHLORIDE 3 MILLILITER(S): 9 INJECTION INTRAMUSCULAR; INTRAVENOUS; SUBCUTANEOUS at 13:04

## 2018-02-01 RX ADMIN — Medication 975 MILLIGRAM(S): at 22:15

## 2018-02-01 RX ADMIN — Medication 650 MILLIGRAM(S): at 09:39

## 2018-02-01 RX ADMIN — Medication 100 MILLIGRAM(S): at 23:26

## 2018-02-01 RX ADMIN — OXYCODONE HYDROCHLORIDE 5 MILLIGRAM(S): 5 TABLET ORAL at 09:39

## 2018-02-01 RX ADMIN — PANTOPRAZOLE SODIUM 40 MILLIGRAM(S): 20 TABLET, DELAYED RELEASE ORAL at 13:06

## 2018-02-01 RX ADMIN — Medication 100 MILLIGRAM(S): at 06:28

## 2018-02-01 RX ADMIN — Medication 650 MILLIGRAM(S): at 09:09

## 2018-02-01 RX ADMIN — Medication 975 MILLIGRAM(S): at 21:44

## 2018-02-01 RX ADMIN — Medication 100 MILLIGRAM(S): at 18:29

## 2018-02-01 RX ADMIN — HYDROMORPHONE HYDROCHLORIDE 0.5 MILLIGRAM(S): 2 INJECTION INTRAMUSCULAR; INTRAVENOUS; SUBCUTANEOUS at 10:32

## 2018-02-01 RX ADMIN — Medication 10 MILLIGRAM(S): at 23:27

## 2018-02-01 RX ADMIN — Medication 325 MILLIGRAM(S): at 13:05

## 2018-02-01 RX ADMIN — OXYCODONE HYDROCHLORIDE 5 MILLIGRAM(S): 5 TABLET ORAL at 09:09

## 2018-02-01 RX ADMIN — OXYCODONE HYDROCHLORIDE 10 MILLIGRAM(S): 5 TABLET ORAL at 13:10

## 2018-02-01 RX ADMIN — OXYCODONE HYDROCHLORIDE 10 MILLIGRAM(S): 5 TABLET ORAL at 19:00

## 2018-02-01 RX ADMIN — ONDANSETRON 4 MILLIGRAM(S): 8 TABLET, FILM COATED ORAL at 09:32

## 2018-02-01 RX ADMIN — HYDROMORPHONE HYDROCHLORIDE 1 MILLIGRAM(S): 2 INJECTION INTRAMUSCULAR; INTRAVENOUS; SUBCUTANEOUS at 08:00

## 2018-02-01 RX ADMIN — OXYCODONE HYDROCHLORIDE 10 MILLIGRAM(S): 5 TABLET ORAL at 12:18

## 2018-02-01 RX ADMIN — Medication 5: at 18:05

## 2018-02-01 RX ADMIN — Medication 5: at 23:28

## 2018-02-01 RX ADMIN — Medication 50 MILLIEQUIVALENT(S): at 06:39

## 2018-02-01 RX ADMIN — ATORVASTATIN CALCIUM 80 MILLIGRAM(S): 80 TABLET, FILM COATED ORAL at 23:26

## 2018-02-01 NOTE — PROGRESS NOTE ADULT - ASSESSMENT
53 y/o male with h/o HTN, HLD, LOAN not on CPAP and GERD with c/o mid-sternal pain x 3 days and belching 12/2017. Pt was seen in ED and underwent nuclear stress test, which revealed fixed inferior wall defect. Cardiac cath revealed left main and RCA coronary stenosis. IVUS confirmed stenosis of mid-distal left main.   Pt now s/p C3L on 1/31/2018. 53 y/o male with h/o HTN, HLD, LOAN not on CPAP and GERD with c/o mid-sternal pain x 3 days and belching 12/2017. Pt was seen in ED and underwent nuclear stress test, which revealed fixed inferior wall defect. Cardiac cath revealed left main and RCA coronary stenosis. IVUS confirmed stenosis of mid-distal left main.   Pt now s/p C3L on 1/31/2018.  Post-op Course:  2/1 transferred to floor, S. POD#1. Mediastinal CT dc'd. 53 y/o male with h/o HTN, HLD, LOAN not on CPAP and GERD with c/o mid-sternal pain x 3 days and belching 12/2017. Pt was seen in ED and underwent nuclear stress test, which revealed fixed inferior wall defect. Cardiac cath revealed left main and RCA coronary stenosis. IVUS confirmed stenosis of mid-distal left main.   Pt now s/p C3L on 1/31/2018.  Post-op Course:  2/1 transferred to floor, S. POD#1. Mediastinal CT dc'd. Desiree ventura'd-DTV.

## 2018-02-01 NOTE — PROGRESS NOTE ADULT - SUBJECTIVE AND OBJECTIVE BOX
Subjective: Pt states "I still have a lot of pain at my incision" denies any CP, SOB, N/V and palpitations. No acute events overnight.     Telemetry: SR 90 - 100  Vital Signs Last 24 Hrs  T(F): 97.5 (18 @ 13:26), Max: 100 (18 @ 21:00)  HR: 85 (18 @ 13:26) (79 - 121)  BP: 110/72 (18 @ 13:26) (110/72 - 114/72)  RR: 18 (18 @ 13:26) (11 - 40)  SpO2: 96% (18 @ 13:26) (92% - 100%)   RA       @ 07:01  -   @ 13:31  --------------------------------------------------------  IN: 500 mL / OUT: 220 mL / NET: 280 mL    Daily Weight in k.2 (2018 00:00)    CAPILLARY BLOOD GLUCOSE  125 - 147     PHYSICAL EXAM  Neurology: A&Ox3, nonfocal, no gross deficits  CV : RRR+S1S2  Sternal Wound: MSI CDI w/DSD, Stable +PW VVI 50  Lungs: Respirations non-labored, B/L BS CTA  Abdomen: Soft, NT/ND, +BSx4Q, negative BM (-)N/V/D  : Voiding without difficulty  Extremities: B/L LE trace edema, negative calf tenderness, +PP, LLE SVG incision CDI           MEDICATIONS  acetaminophen   Tablet. 975 milliGRAM(s) Oral every 8 hours  aspirin enteric coated 325 milliGRAM(s) Oral daily  atorvastatin 80 milliGRAM(s) Oral at bedtime  cefuroxime  IVPB 1500 milliGRAM(s) IV Intermittent every 8 hours  docusate sodium 100 milliGRAM(s) Oral three times a day  enoxaparin Injectable 40 milliGRAM(s) SubCutaneous daily  insulin lispro (HumaLOG) corrective regimen sliding scale   SubCutaneous Before meals and at bedtime  metoclopramide Injectable 10 milliGRAM(s) IV Push every 8 hours  metoprolol     tartrate 25 milliGRAM(s) Oral two times a day  oxyCODONE    IR 10 milliGRAM(s) Oral every 4 hours PRN  oxyCODONE    IR 5 milliGRAM(s) Oral every 4 hours PRN  pantoprazole  Injectable 40 milliGRAM(s) IV Push daily  polyethylene glycol 3350 17 Gram(s) Oral daily  sodium chloride 0.9% lock flush 3 milliLiter(s) IV Push every 8 hours  sodium chloride 0.9%. 1000 milliLiter(s) IV Continuous <Continuous>      Physical Therapy Rec:   Home  [  ]   Home w/ PT  [  ]  Rehab  [  ]    Discussed with Cardiothoracic Team at AM rounds. Subjective: Pt states "I still have a lot of pain at my incision" denies any CP, SOB, N/V and palpitations. No acute events overnight.     Telemetry: SR 90 - 100  Vital Signs Last 24 Hrs  T(F): 97.5 (18 @ 13:26), Max: 100 (18 @ 21:00)  HR: 85 (18 @ 13:26) (79 - 121)  BP: 110/72 (18 @ 13:26) (110/72 - 114/72)  RR: 18 (18 @ 13:26) (11 - 40)  SpO2: 96% (18 @ 13:26) (92% - 100%)   RA       @ 07:01  -   @ 13:31  --------------------------------------------------------  IN: 500 mL / OUT: 220 mL / NET: 280 mL    Daily Weight in k.2 (2018 00:00)    CAPILLARY BLOOD GLUCOSE  125 - 147     PHYSICAL EXAM  Neurology: A&Ox3, nonfocal, no gross deficits  CV : RRR+S1S2  Sternal Wound: MSI CDI w/DSD, Stable +PW VVI 50  Lungs: Respirations non-labored, B/L BS CTA   +Left pleural CT to LWS with serosanguinous drainage -90/24h, no air leak  Abdomen: Soft, NT/ND, +BSx4Q, negative BM (-)N/V/D  : Voiding without difficulty  Extremities: B/L LE trace edema, negative calf tenderness, +PP, LLE SVG incision CDI           MEDICATIONS  acetaminophen   Tablet. 975 milliGRAM(s) Oral every 8 hours  aspirin enteric coated 325 milliGRAM(s) Oral daily  atorvastatin 80 milliGRAM(s) Oral at bedtime  cefuroxime  IVPB 1500 milliGRAM(s) IV Intermittent every 8 hours  docusate sodium 100 milliGRAM(s) Oral three times a day  enoxaparin Injectable 40 milliGRAM(s) SubCutaneous daily  insulin lispro (HumaLOG) corrective regimen sliding scale   SubCutaneous Before meals and at bedtime  metoclopramide Injectable 10 milliGRAM(s) IV Push every 8 hours  metoprolol     tartrate 25 milliGRAM(s) Oral two times a day  oxyCODONE    IR 10 milliGRAM(s) Oral every 4 hours PRN  oxyCODONE    IR 5 milliGRAM(s) Oral every 4 hours PRN  pantoprazole  Injectable 40 milliGRAM(s) IV Push daily  polyethylene glycol 3350 17 Gram(s) Oral daily  sodium chloride 0.9% lock flush 3 milliLiter(s) IV Push every 8 hours  sodium chloride 0.9%. 1000 milliLiter(s) IV Continuous <Continuous>      Physical Therapy Rec:   Home  [  ]   Home w/ PT  [  ]  Rehab  [  ]    Discussed with Cardiothoracic Team at AM rounds. Subjective: Pt states "I still have a lot of pain at my incision" denies any CP, SOB, N/V and palpitations. No acute events overnight.     Telemetry: SR 90 - 100  Vital Signs Last 24 Hrs  T(F): 97.5 (18 @ 13:26), Max: 100 (18 @ 21:00)  HR: 85 (18 @ 13:26) (79 - 121)  BP: 110/72 (18 @ 13:26) (110/72 - 114/72)  RR: 18 (18 @ 13:26) (11 - 40)  SpO2: 96% (18 @ 13:26) (92% - 100%)   3L NC       @ 07:01  -   @ 13:31  --------------------------------------------------------  IN: 500 mL / OUT: 220 mL / NET: 280 mL    Daily Weight in k.2 (2018 00:00)    CAPILLARY BLOOD GLUCOSE  125 - 147     PHYSICAL EXAM  Neurology: A&Ox3, nonfocal, no gross deficits  CV : RRR+S1S2  Sternal Wound: MSI CDI w/DSD, Stable +PW- EPM off  Lungs: Respirations non-labored, B/L BS CTA   +Left pleural CT to LWS with serosanguinous drainage -90/24h, no air leak  Abdomen: Soft, NT/ND, +BSx4Q, negative BM (-)N/V/D  : Voiding without difficulty  Extremities: B/L LE trace edema, negative calf tenderness, +PP, LLE SVG incision CDI with ACE wrap           MEDICATIONS  acetaminophen   Tablet. 975 milliGRAM(s) Oral every 8 hours  aspirin enteric coated 325 milliGRAM(s) Oral daily  atorvastatin 80 milliGRAM(s) Oral at bedtime  cefuroxime  IVPB 1500 milliGRAM(s) IV Intermittent every 8 hours  docusate sodium 100 milliGRAM(s) Oral three times a day  enoxaparin Injectable 40 milliGRAM(s) SubCutaneous daily  insulin lispro (HumaLOG) corrective regimen sliding scale   SubCutaneous Before meals and at bedtime  metoclopramide Injectable 10 milliGRAM(s) IV Push every 8 hours  metoprolol     tartrate 25 milliGRAM(s) Oral two times a day  oxyCODONE    IR 10 milliGRAM(s) Oral every 4 hours PRN  oxyCODONE    IR 5 milliGRAM(s) Oral every 4 hours PRN  pantoprazole  Injectable 40 milliGRAM(s) IV Push daily  polyethylene glycol 3350 17 Gram(s) Oral daily  sodium chloride 0.9% lock flush 3 milliLiter(s) IV Push every 8 hours  sodium chloride 0.9%. 1000 milliLiter(s) IV Continuous <Continuous>      Physical Therapy Rec:   Home  [  ]   Home w/ PT  [  ]  Rehab  [  ]    Discussed with Cardiothoracic Team at AM rounds.

## 2018-02-01 NOTE — PROGRESS NOTE ADULT - SUBJECTIVE AND OBJECTIVE BOX
Operation s/p C3L  POD 1    Patient is doing well, resting comfortably; complains of mild incisional pain that is relieved by pain medication.    Vital Signs Last 24 Hrs  T(C): 37.5 (01 Feb 2018 00:00), Max: 37.8 (31 Jan 2018 21:00)  T(F): 99.5 (01 Feb 2018 00:00), Max: 100 (31 Jan 2018 21:00)  HR: 102 (01 Feb 2018 00:00) (79 - 121)  BP: 130/87 (31 Jan 2018 07:35) (130/87 - 130/87)  BP(mean): --  RR: 20 (01 Feb 2018 00:00) (12 - 39)  SpO2: 97% (01 Feb 2018 00:00) (95% - 100%)  I&O's Detail    31 Jan 2018 07:01  -  01 Feb 2018 00:55  --------------------------------------------------------  IN:    Albumin 5%  - 250 mL: 500 mL    insulin Infusion: 9 mL    IV PiggyBack: 250 mL    niCARdipine Infusion: 25 mL    norepinephrine Infusion: 1 mL    sodium chloride 0.9%.: 50 mL  Total IN: 835 mL    OUT:    Chest Tube: 150 mL    Chest Tube: 50 mL    Indwelling Catheter - Urethral: 1590 mL    Nasoenteral Tube: 20 mL  Total OUT: 1810 mL    Total NET: -975 mL          CHEST TUBES:  on Suction  EPICARDIAL WIRES: AV wires   URBAN: Yes    MEDICATIONS  (STANDING):  aspirin enteric coated 325 milliGRAM(s) Oral daily  aspirin Suppository 300 milliGRAM(s) Rectal once  cefuroxime  IVPB 1500 milliGRAM(s) IV Intermittent every 8 hours  docusate sodium 100 milliGRAM(s) Oral three times a day  insulin Infusion 1 Unit(s)/Hr (1 mL/Hr) IV Continuous <Continuous>  metoclopramide Injectable 10 milliGRAM(s) IV Push every 8 hours  niCARdipine Infusion 3 mG/Hr (15 mL/Hr) IV Continuous <Continuous>  norepinephrine Infusion 0.04 MICROgram(s)/kG/Min (7.703 mL/Hr) IV Continuous <Continuous>  pantoprazole  Injectable 40 milliGRAM(s) IV Push daily  polyethylene glycol 3350 17 Gram(s) Oral daily  sodium chloride 0.9%. 1000 milliLiter(s) (10 mL/Hr) IV Continuous <Continuous>    MEDICATIONS  (PRN):  acetaminophen   Tablet. 650 milliGRAM(s) Oral every 6 hours PRN Mild Pain (1 - 3)  meperidine     Injectable 25 milliGRAM(s) IV Push once PRN For Shivering  oxyCODONE    IR 5 milliGRAM(s) Oral every 4 hours PRN Moderate Pain (4 - 6)      Does the patient have a history of CHF?  No    Patient extubated within 24 hours    CXR reviewed with attending.     Does the patient have a history of kidney disease?   -normal kidneys    Did the patient receive transfusion of blood and/or products?  -None    DVT PPX with vendodynes as well as chemical prophylaxis.    Jyoti-operative antibiotics to be discontinued within 48 hours of CTU admission  Beta-blockers contraindicated for the first 24 hours due to vasopressor/inotropic medication  Patient care discussed on morning interdisciplinary rounds with CTS team.

## 2018-02-01 NOTE — PROGRESS NOTE ADULT - PROBLEM SELECTOR PLAN 1
C/W asa, beta-blocker, statin.  Uptitrate beta-blocker as tolerated.  OOB to chair, Ambulate daily as tolerated. Pending PT eval.  Cough and deep breathe, Incentive Spirometry Q1h, Chest PT.   C/W GI prophylaxis on protonix and DVT prophylaxis on SQ Lovenox.   Disposition: Home when medically cleared.

## 2018-02-01 NOTE — PROGRESS NOTE ADULT - ASSESSMENT
52 year old with angina and severe CAD s/p C3L with LIMA to the LAD, and VGs to the OM and RCA.  Doing well postoperatively.  Continue asa/statin/beta-blocker.   Stable anemia and renal function  Ambulate per CTS.

## 2018-02-01 NOTE — PROGRESS NOTE ADULT - SUBJECTIVE AND OBJECTIVE BOX
HPI: He feels well and has been transferred to the telemetry unit.   s/p C3L     Review Of Systems:  No orthopnea or leg edema. The remainder of his ROS is unchanged.    Medications:  acetaminophen   Tablet. 650 milliGRAM(s) Oral every 6 hours PRN  aspirin enteric coated 325 milliGRAM(s) Oral daily  atorvastatin 80 milliGRAM(s) Oral at bedtime  cefuroxime  IVPB 1500 milliGRAM(s) IV Intermittent every 8 hours  docusate sodium 100 milliGRAM(s) Oral three times a day  enoxaparin Injectable 40 milliGRAM(s) SubCutaneous daily  insulin lispro (HumaLOG) corrective regimen sliding scale   SubCutaneous Before meals and at bedtime  metoclopramide Injectable 10 milliGRAM(s) IV Push every 8 hours  metoprolol     tartrate 25 milliGRAM(s) Oral every 12 hours  niCARdipine Infusion 3 mG/Hr IV Continuous <Continuous>  norepinephrine Infusion 0.04 MICROgram(s)/kG/Min IV Continuous <Continuous>  oxyCODONE    IR 5 milliGRAM(s) Oral every 4 hours PRN  pantoprazole  Injectable 40 milliGRAM(s) IV Push daily  polyethylene glycol 3350 17 Gram(s) Oral daily  sodium chloride 0.9% lock flush 3 milliLiter(s) IV Push every 8 hours  sodium chloride 0.9%. 1000 milliLiter(s) IV Continuous <Continuous>    Allergies    No Known Allergies    Intolerances        Vitals:  T(F): 98.4 (18 @ 10:56), Max: 100 (18 @ 21:00)  HR: 88 (18 @ 10:56) (79 - 121)  BP: 114/72 (18 @ 10:56) (114/72 - 114/72)  RR: 20 (18 @ 10:56) (11 - 40)  SpO2: 92% (18 @ 10:56) (92% - 100%)  Daily     Daily Weight in k.2 (2018 00:00)  I&O's Summary    2018 07:01  -  2018 07:00  --------------------------------------------------------  IN: 1814 mL / OUT: 2565 mL / NET: -751 mL    2018 07:01  -  2018 11:12  --------------------------------------------------------  IN: 320 mL / OUT: 220 mL / NET: 100 mL        Physical Exam:  Appearance: NAD  HEENT: No icterus or JVD  Cardiovascular: Regular rhythm, normal S1, S2, No murmurs or rubs, No edema, left leg wrapped.   Respiratory: clear to ascultation bilaterally, with scant bibasilar crackles.  Gastrointestinal: +BS, soft  Musculoskeletal: No clubbing  Neurologic: Non-focal, alert and oriented, Mood & affect appropriate, a bit sedate.  Lymphatic: No lymphadenopathy  Skin: Warm and moist, no cyanosis                          12.2   13.0  )-----------( 150      ( 2018 01:33 )             33.9     02-01    140  |  104  |  20  ----------------------------<  142<H>  4.1   |  25  |  1.21    Ca    8.8      2018 01:33    TPro  6.5  /  Alb  4.3  /  TBili  0.7  /  DBili  x   /  AST  27  /  ALT  21  /  AlkPhos  46  02-    CARDIAC MARKERS ( 2018 15:50 )  x     / 0.33 ng/mL / 329 U/L / x     / 16.8 ng/mL      PT/INR - ( 2018 01:33 )   PT: 14.5 sec;   INR: 1.33 ratio         PTT - ( 2018 01:33 )  PTT:25.3 sec          Interpretation of Telemetry: SR

## 2018-02-01 NOTE — PROGRESS NOTE ADULT - PROBLEM SELECTOR PLAN 1
s/p CABG plan to start beta blockers to optimize cardiac function and blood pressure control  Will start ASA, statins and diuretic as needed  dvt/GI prophylaxis  Pain control  PT evaluation   monitor chest tube drainage and remove when criteria met

## 2018-02-01 NOTE — PROGRESS NOTE ADULT - ASSESSMENT
PHYSICAL EXAM    Constitutitional: A&O x 4, No acute respiratory distress    Respiratory: CTA bilaterally, mildly diminished breath sounds at bilateral bases.  No rhonchi, rales    Cardiovascular:  +S1, +S2 Regular rate and rhythm    Gastrointestinal:  Abdomen is soft, nontender, non distended.  Normal bowel sounds throughout.     Extremities: MINER, following commands; pulses 1+ and symmetrical throughout.  No edema, no swelling, no tenderness noted on exam

## 2018-02-02 LAB
ANION GAP SERPL CALC-SCNC: 10 MMOL/L — SIGNIFICANT CHANGE UP (ref 5–17)
BUN SERPL-MCNC: 17 MG/DL — SIGNIFICANT CHANGE UP (ref 7–23)
CALCIUM SERPL-MCNC: 9.4 MG/DL — SIGNIFICANT CHANGE UP (ref 8.4–10.5)
CHLORIDE SERPL-SCNC: 101 MMOL/L — SIGNIFICANT CHANGE UP (ref 96–108)
CO2 SERPL-SCNC: 26 MMOL/L — SIGNIFICANT CHANGE UP (ref 22–31)
CREAT SERPL-MCNC: 0.99 MG/DL — SIGNIFICANT CHANGE UP (ref 0.5–1.3)
GLUCOSE BLDC GLUCOMTR-MCNC: 119 MG/DL — HIGH (ref 70–99)
GLUCOSE BLDC GLUCOMTR-MCNC: 122 MG/DL — HIGH (ref 70–99)
GLUCOSE BLDC GLUCOMTR-MCNC: 122 MG/DL — HIGH (ref 70–99)
GLUCOSE BLDC GLUCOMTR-MCNC: 130 MG/DL — HIGH (ref 70–99)
GLUCOSE SERPL-MCNC: 142 MG/DL — HIGH (ref 70–99)
HCT VFR BLD CALC: 33.9 % — LOW (ref 39–50)
HGB BLD-MCNC: 12.2 G/DL — LOW (ref 13–17)
MCHC RBC-ENTMCNC: 32.6 PG — SIGNIFICANT CHANGE UP (ref 27–34)
MCHC RBC-ENTMCNC: 35.9 GM/DL — SIGNIFICANT CHANGE UP (ref 32–36)
MCV RBC AUTO: 90.7 FL — SIGNIFICANT CHANGE UP (ref 80–100)
PLATELET # BLD AUTO: 154 K/UL — SIGNIFICANT CHANGE UP (ref 150–400)
POTASSIUM SERPL-MCNC: 4.8 MMOL/L — SIGNIFICANT CHANGE UP (ref 3.5–5.3)
POTASSIUM SERPL-SCNC: 4.8 MMOL/L — SIGNIFICANT CHANGE UP (ref 3.5–5.3)
RBC # BLD: 3.73 M/UL — LOW (ref 4.2–5.8)
RBC # FLD: 11.7 % — SIGNIFICANT CHANGE UP (ref 10.3–14.5)
SODIUM SERPL-SCNC: 137 MMOL/L — SIGNIFICANT CHANGE UP (ref 135–145)
WBC # BLD: 21.5 K/UL — HIGH (ref 3.8–10.5)
WBC # FLD AUTO: 21.5 K/UL — HIGH (ref 3.8–10.5)

## 2018-02-02 PROCEDURE — 71045 X-RAY EXAM CHEST 1 VIEW: CPT | Mod: 26

## 2018-02-02 PROCEDURE — 99233 SBSQ HOSP IP/OBS HIGH 50: CPT

## 2018-02-02 RX ORDER — METOPROLOL TARTRATE 50 MG
50 TABLET ORAL
Qty: 0 | Refills: 0 | Status: DISCONTINUED | OUTPATIENT
Start: 2018-02-02 | End: 2018-02-05

## 2018-02-02 RX ORDER — METOPROLOL TARTRATE 50 MG
25 TABLET ORAL ONCE
Qty: 0 | Refills: 0 | Status: COMPLETED | OUTPATIENT
Start: 2018-02-02 | End: 2018-02-02

## 2018-02-02 RX ORDER — PANTOPRAZOLE SODIUM 20 MG/1
40 TABLET, DELAYED RELEASE ORAL
Qty: 0 | Refills: 0 | Status: DISCONTINUED | OUTPATIENT
Start: 2018-02-02 | End: 2018-02-05

## 2018-02-02 RX ADMIN — Medication 25 MILLIGRAM(S): at 05:43

## 2018-02-02 RX ADMIN — Medication 100 MILLIGRAM(S): at 21:56

## 2018-02-02 RX ADMIN — Medication 2: at 07:58

## 2018-02-02 RX ADMIN — Medication 50 MILLIGRAM(S): at 16:47

## 2018-02-02 RX ADMIN — OXYCODONE HYDROCHLORIDE 10 MILLIGRAM(S): 5 TABLET ORAL at 19:05

## 2018-02-02 RX ADMIN — SODIUM CHLORIDE 3 MILLILITER(S): 9 INJECTION INTRAMUSCULAR; INTRAVENOUS; SUBCUTANEOUS at 21:57

## 2018-02-02 RX ADMIN — ENOXAPARIN SODIUM 40 MILLIGRAM(S): 100 INJECTION SUBCUTANEOUS at 11:24

## 2018-02-02 RX ADMIN — OXYCODONE HYDROCHLORIDE 10 MILLIGRAM(S): 5 TABLET ORAL at 22:30

## 2018-02-02 RX ADMIN — OXYCODONE HYDROCHLORIDE 5 MILLIGRAM(S): 5 TABLET ORAL at 14:05

## 2018-02-02 RX ADMIN — OXYCODONE HYDROCHLORIDE 10 MILLIGRAM(S): 5 TABLET ORAL at 18:29

## 2018-02-02 RX ADMIN — Medication 100 MILLIGRAM(S): at 02:15

## 2018-02-02 RX ADMIN — Medication 975 MILLIGRAM(S): at 06:15

## 2018-02-02 RX ADMIN — OXYCODONE HYDROCHLORIDE 10 MILLIGRAM(S): 5 TABLET ORAL at 11:55

## 2018-02-02 RX ADMIN — Medication 2: at 16:28

## 2018-02-02 RX ADMIN — Medication 975 MILLIGRAM(S): at 22:30

## 2018-02-02 RX ADMIN — Medication 325 MILLIGRAM(S): at 11:24

## 2018-02-02 RX ADMIN — Medication 975 MILLIGRAM(S): at 15:37

## 2018-02-02 RX ADMIN — Medication 100 MILLIGRAM(S): at 15:07

## 2018-02-02 RX ADMIN — ATORVASTATIN CALCIUM 80 MILLIGRAM(S): 80 TABLET, FILM COATED ORAL at 21:56

## 2018-02-02 RX ADMIN — Medication 975 MILLIGRAM(S): at 05:43

## 2018-02-02 RX ADMIN — Medication 975 MILLIGRAM(S): at 21:56

## 2018-02-02 RX ADMIN — POLYETHYLENE GLYCOL 3350 17 GRAM(S): 17 POWDER, FOR SOLUTION ORAL at 11:24

## 2018-02-02 RX ADMIN — Medication 25 MILLIGRAM(S): at 11:24

## 2018-02-02 RX ADMIN — SODIUM CHLORIDE 3 MILLILITER(S): 9 INJECTION INTRAMUSCULAR; INTRAVENOUS; SUBCUTANEOUS at 15:05

## 2018-02-02 RX ADMIN — Medication 975 MILLIGRAM(S): at 15:07

## 2018-02-02 RX ADMIN — SODIUM CHLORIDE 3 MILLILITER(S): 9 INJECTION INTRAMUSCULAR; INTRAVENOUS; SUBCUTANEOUS at 05:44

## 2018-02-02 RX ADMIN — OXYCODONE HYDROCHLORIDE 10 MILLIGRAM(S): 5 TABLET ORAL at 11:24

## 2018-02-02 RX ADMIN — Medication 10 MILLIGRAM(S): at 05:43

## 2018-02-02 RX ADMIN — OXYCODONE HYDROCHLORIDE 5 MILLIGRAM(S): 5 TABLET ORAL at 13:34

## 2018-02-02 RX ADMIN — Medication 100 MILLIGRAM(S): at 05:43

## 2018-02-02 RX ADMIN — OXYCODONE HYDROCHLORIDE 10 MILLIGRAM(S): 5 TABLET ORAL at 21:56

## 2018-02-02 NOTE — PHYSICAL THERAPY INITIAL EVALUATION ADULT - DISCHARGE DISPOSITION, PT EVAL
: DC working towards home with home PT, pt and pts brother adamently requesting subacute rehab, CM Zora aware.

## 2018-02-02 NOTE — PROGRESS NOTE ADULT - SUBJECTIVE AND OBJECTIVE BOX
VITAL SIGNS    Subjective: "Hello; is this tube coming out?"     Telemetry:  NSR  (110)    Vital Signs Last 24 Hrs  T(C): 36.7 (18 @ 05:34), Max: 36.7 (18 @ 20:21)  T(F): 98.1 (18 @ 05:34), Max: 98.1 (18 @ 20:21)  HR: 113 (18 @ 05:34) (85 - 113)  BP: 128/80 (18 @ 05:34) (110/72 - 128/80)  RR: 17 (18 @ 05:34) (17 - 18)  SpO2: 92% (18 @ 05:34) (90% - 96%)            @ 07:01  -   @ 07:00  --------------------------------------------------------  IN: 1510 mL / OUT: 2740 mL / NET: -1230 mL     @ 07:01  -   @ 13:15  --------------------------------------------------------  IN: 240 mL / OUT: 500 mL / NET: -260 mL      Daily     Daily Weight in k.1 (2018 07:00)      CAPILLARY BLOOD GLUCOSE      POCT Blood Glucose.: 119 mg/dL (2018 11:39)  POCT Blood Glucose.: 130 mg/dL (2018 07:33)  POCT Blood Glucose.: 168 mg/dL (2018 22:27)  POCT Blood Glucose.: 161 mg/dL (2018 18:03)  POCT Blood Glucose.: 146 mg/dL (2018 16:17)          Drains:  L Pleural  [ X ]  Drainage:  Left pleural CT --> LWS              R Pleural  [  ]  Drainage:    Pacing Wires        [ X ]   Settings:   EPM --> OFF                                Isolated  [  ]     PHYSICAL EXAM      Neurology: alert and oriented x 3, nonfocal, no gross deficits    CV: (+) S1 and S2, No murmurs, rubs, gallops or clicks     Sternal Wound:  MSI -->CDI , sternum stable    Lungs: CTA B/L     Abdomen: soft, nontender, nondistended, positive bowel sounds, (+) Flatus; (+) BM     :  Voiding               Extremities:  B/L LE (+) edema; negative calf tenderness; (+) 2 DP palpable; Left SVG with ace wrap C/D/I         acetaminophen Tablet. 975 milliGRAM(s) Oral every 8 hours  aspirin enteric coated 325 milliGRAM(s) Oral daily  atorvastatin 80 milliGRAM(s) Oral at bedtime  docusate sodium 100 milliGRAM(s) Oral three times a day  enoxaparin Injectable 40 milliGRAM(s) SubCutaneous daily  insulin lispro (HumaLOG) corrective regimen sliding scale   SubCutaneous Before meals and at bedtime  metoprolol  tartrate 50 milliGRAM(s) Oral two times a day  oxyCODONE IR 10 milliGRAM(s) Oral every 4 hours PRN  oxyCODONE IR 5 milliGRAM(s) Oral every 4 hours PRN  pantoprazole Tablet 40 milliGRAM(s) Oral before breakfast  polyethylene glycol 3350 17 Gram(s) Oral daily  sodium chloride 0.9% lock flush 3 milliLiter(s) IV Push every 8 hours  sodium chloride 0.9%. 1000 milliLiter(s) IV Continuous <Continuous>    Physical Therapy Rec:   Home  [  ]   Home w/ PT  [ X ]  Rehab  [  ]    Discussed with Cardiothoracic Team at AM rounds. VITAL SIGNS    Subjective: "Hello; is this tube coming out?"     Telemetry:  NSR  (110)    Vital Signs Last 24 Hrs  T(C): 36.7 (18 @ 05:34), Max: 36.7 (18 @ 20:21)  T(F): 98.1 (18 @ 05:34), Max: 98.1 (18 @ 20:21)  HR: 113 (18 @ 05:34) (85 - 113)  BP: 128/80 (18 @ 05:34) (110/72 - 128/80)  RR: 17 (18 @ 05:34) (17 - 18)  SpO2: 92% (18 @ 05:34) (90% - 96%)            @ 07:01  -   @ 07:00  --------------------------------------------------------  IN: 1510 mL / OUT: 2740 mL / NET: -1230 mL     @ 07:01  -   @ 13:15  --------------------------------------------------------  IN: 240 mL / OUT: 500 mL / NET: -260 mL      Daily     Daily Weight in k.1 (2018 07:00)      CAPILLARY BLOOD GLUCOSE      POCT Blood Glucose.: 119 mg/dL (2018 11:39)  POCT Blood Glucose.: 130 mg/dL (2018 07:33)  POCT Blood Glucose.: 168 mg/dL (2018 22:27)  POCT Blood Glucose.: 161 mg/dL (2018 18:03)  POCT Blood Glucose.: 146 mg/dL (2018 16:17)          Drains:  L Pleural  [ X ]  Drainage:  Left pleural CT --> LWS              R Pleural  [  ]  Drainage:    Pacing Wires        [ X ]   Settings:   EPM --> OFF                                Isolated  [  ]     PHYSICAL EXAM      Neurology: alert and oriented x 3, nonfocal, no gross deficits    CV: (+) S1 and S2, No murmurs, rubs, gallops or clicks     Sternal Wound:  MSI -->CDI , sternum stable    Lungs: CTA B/L     Abdomen: soft, nontender, nondistended, positive bowel sounds, (+) Flatus; (+) BM     :  Voiding               Extremities:  B/L LE (+) edema; negative calf tenderness; (+) 2 DP palpable; Left SVG with ace wrap C/D/I; Left neck (+) crepitus         acetaminophen Tablet. 975 milliGRAM(s) Oral every 8 hours  aspirin enteric coated 325 milliGRAM(s) Oral daily  atorvastatin 80 milliGRAM(s) Oral at bedtime  docusate sodium 100 milliGRAM(s) Oral three times a day  enoxaparin Injectable 40 milliGRAM(s) SubCutaneous daily  insulin lispro (HumaLOG) corrective regimen sliding scale   SubCutaneous Before meals and at bedtime  metoprolol  tartrate 50 milliGRAM(s) Oral two times a day  oxyCODONE IR 10 milliGRAM(s) Oral every 4 hours PRN  oxyCODONE IR 5 milliGRAM(s) Oral every 4 hours PRN  pantoprazole Tablet 40 milliGRAM(s) Oral before breakfast  polyethylene glycol 3350 17 Gram(s) Oral daily  sodium chloride 0.9% lock flush 3 milliLiter(s) IV Push every 8 hours  sodium chloride 0.9%. 1000 milliLiter(s) IV Continuous <Continuous>    Physical Therapy Rec:   Home  [  ]   Home w/ PT  [ X ]  Rehab  [  ]    Discussed with Cardiothoracic Team at AM rounds.

## 2018-02-02 NOTE — PHYSICAL THERAPY INITIAL EVALUATION ADULT - PRECAUTIONS/LIMITATIONS, REHAB EVAL
sternal precautions/fall precautions/Post-op Course: 2/1 transferred to floor, VSS. POD#1. Mediastinal CT dc'd. Desiree dc'd-DTV. 2/2 Left neck (+) subcutaneous emphysema, CXR New left neck subcutaneous emphysema, likely secondary to pneumomediastinum. Small left pleural effusion. No pneumothorax. The cardiomediastinal silhouette is not well evaluated on this projection. Status post median sternotomy. Small left pleural effusion. New subcutaneous emphysema in the left neck, likely secondary to pneumomediastinum./cardiac precautions

## 2018-02-02 NOTE — PHYSICAL THERAPY INITIAL EVALUATION ADULT - PERTINENT HX OF CURRENT PROBLEM, REHAB EVAL
Pt is a 53 y/o male admitted to [facility] on 1/31/18 with h/o HTN, HLD, LOAN not on CPAP and GERD with c/o mid-sternal pain x 3 days and belching 12/2017. Pt was seen in ED and underwent nuclear stress test, which revealed fixed inferior wall defect. Cardiac cath revealed left main and RCA coronary stenosis. IVUS confirmed stenosis of mid-distal left main

## 2018-02-02 NOTE — PROGRESS NOTE ADULT - ASSESSMENT
51 y/o male with h/o HTN, HLD, LOAN not on CPAP and GERD with c/o mid-sternal pain x 3 days and belching 12/2017. Pt was seen in ED and underwent nuclear stress test, which revealed fixed inferior wall defect. Cardiac cath revealed left main and RCA coronary stenosis. IVUS confirmed stenosis of mid-distal left main.   Pt now s/p C3L on 1/31/2018.  Post-op Course:  2/1 transferred to floor, S. POD#1. Mediastinal CT dc'd. Ramírez dc'd-DTV.  2/2 Left neck (+) subcutaneous emphysema --> CXR New left neck subcutaneous emphysema, likely secondary to pneumomediastinum. Small left pleural effusion. No pneumothorax. The cardiomediastinal silhouette is not well evaluated on this projection. Status post median sternotomy. IMPRESSION:  Small left pleural effusion. New subcutaneous emphysema in the left neck, likely secondary to pneumomediastinum. Left Pleural CT --> LWS   Disposition: Home PT 51 y/o male with h/o HTN, HLD, LOAN not on CPAP and GERD with c/o mid-sternal pain x 3 days and belching 12/2017. Pt was seen in ED and underwent nuclear stress test, which revealed fixed inferior wall defect. Cardiac cath revealed left main and RCA coronary stenosis. IVUS confirmed stenosis of mid-distal left main.   Pt now s/p C3L on 1/31/2018.  Post-op Course:  2/1 transferred to floor, S. POD#1. Mediastinal CT dc'd. Ramírez dc'd-DTV.  2/2 Left neck (+) subcutaneous emphysema --> CXR New left neck subcutaneous emphysema, likely secondary to pneumomediastinum. Small left pleural effusion. No pneumothorax. The cardiomediastinal silhouette is not well evaluated on this projection. Status post median sternotomy. IMPRESSION:  Small left pleural effusion. New subcutaneous emphysema in the left neck, likely secondary to pneumomediastinum. Left Pleural CT --> LWS ; Leukocytosis --> likely reactive; continue to monitor   Disposition: Home PT

## 2018-02-02 NOTE — PROGRESS NOTE ADULT - ASSESSMENT
52 year old with angina and severe CAD s/p C3L with LIMA to the LAD, and VGs to the OM and RCA.  Doing well postoperatively. Left chest tube and subcutaneous emphysema to be managed by CTS.  Continue asa/statin/beta-blocker.   Stable anemia and renal function  Ambulate per CTS.    Dr. Alvarez to see him over the weekend.

## 2018-02-02 NOTE — PHYSICAL THERAPY INITIAL EVALUATION ADULT - ADDITIONAL COMMENTS
Pt lives alone in apt, +few steps to [place], +flight of stairs to apt, with railing, PTA ind amb and ADLs, +driving and works as an . Pts original plan was to stay with mother however, pts mother with recent fall and staying at sisters, unable to assist at this time.

## 2018-02-02 NOTE — PROGRESS NOTE ADULT - SUBJECTIVE AND OBJECTIVE BOX
HPI: He feels fine. Some swelling in his left neck.  No chest pain or leg pain.    The medical history is unchanged.  Review Of Systems:  No orthopnea or change in leg edema. The remainder of his ROS is unchanged.    Medications:  acetaminophen   Tablet. 975 milliGRAM(s) Oral every 8 hours  aspirin enteric coated 325 milliGRAM(s) Oral daily  atorvastatin 80 milliGRAM(s) Oral at bedtime  docusate sodium 100 milliGRAM(s) Oral three times a day  enoxaparin Injectable 40 milliGRAM(s) SubCutaneous daily  insulin lispro (HumaLOG) corrective regimen sliding scale   SubCutaneous Before meals and at bedtime  metoprolol     tartrate 50 milliGRAM(s) Oral two times a day  oxyCODONE    IR 10 milliGRAM(s) Oral every 4 hours PRN  oxyCODONE    IR 5 milliGRAM(s) Oral every 4 hours PRN  pantoprazole    Tablet 40 milliGRAM(s) Oral before breakfast  polyethylene glycol 3350 17 Gram(s) Oral daily  sodium chloride 0.9% lock flush 3 milliLiter(s) IV Push every 8 hours  sodium chloride 0.9%. 1000 milliLiter(s) IV Continuous <Continuous>    Allergies    No Known Allergies    Intolerances        Vitals:  T(F): 98 (18 @ 13:41), Max: 98.1 (18 @ 20:21)  HR: 102 (18 @ 13:41) (91 - 113)  BP: 128/83 (18 @ 13:41) (119/77 - 128/83)  RR: 20 (18 @ 13:41) (17 - 20)  SpO2: 92% (18 @ 13:41) (90% - 92%)  Daily     Daily Weight in k.1 (2018 07:00)  I&O's Summary    2018 07:01  -  2018 07:00  --------------------------------------------------------  IN: 1510 mL / OUT: 2740 mL / NET: -1230 mL    2018 07: 16:05  --------------------------------------------------------  IN: 420 mL / OUT: 1300 mL / NET: -880 mL        Physical Exam:  Appearance: NAD  HEENT: No icterus or JVD  Cardiovascular: Regular rhythm, normal S1, S2, No murmurs or rubs, No edema  Respiratory: Decreased BS at bases b/l.  Gastrointestinal: +BS, soft  Musculoskeletal: No clubbing  Neurologic: Non-focal, alert and oriented, Mood & affect appropriate  Lymphatic: No lymphadenopathy  Skin: Warm and moist, no cyanosis                          12.2   21.5  )-----------( 154      ( 2018 05:44 )             33.9         137  |  101  |  17  ----------------------------<  142<H>  4.8   |  26  |  0.99    Ca    9.4      2018 05:44    TPro  6.5  /  Alb  4.3  /  TBili  0.7  /  DBili  x   /  AST  27  /  ALT  21  /  AlkPhos  46  02        PT/INR - ( 2018 01:33 )   PT: 14.5 sec;   INR: 1.33 ratio         PTT - ( 2018 01:33 )  PTT:25.3 sec          Interpretation of Telemetry: SR  CXR: < from: Xray Chest 1 View AP/PA (18 @ 05:51) >  IMPRESSION:   Small left pleural effusion.    New subcutaneous emphysema in the left neck, likely secondary to   pneumomediastinum.    < end of copied text >    Imaging:

## 2018-02-03 LAB
ANION GAP SERPL CALC-SCNC: 11 MMOL/L — SIGNIFICANT CHANGE UP (ref 5–17)
BUN SERPL-MCNC: 17 MG/DL — SIGNIFICANT CHANGE UP (ref 7–23)
CALCIUM SERPL-MCNC: 8.9 MG/DL — SIGNIFICANT CHANGE UP (ref 8.4–10.5)
CHLORIDE SERPL-SCNC: 101 MMOL/L — SIGNIFICANT CHANGE UP (ref 96–108)
CO2 SERPL-SCNC: 26 MMOL/L — SIGNIFICANT CHANGE UP (ref 22–31)
CREAT SERPL-MCNC: 1.03 MG/DL — SIGNIFICANT CHANGE UP (ref 0.5–1.3)
GLUCOSE BLDC GLUCOMTR-MCNC: 105 MG/DL — HIGH (ref 70–99)
GLUCOSE BLDC GLUCOMTR-MCNC: 111 MG/DL — HIGH (ref 70–99)
GLUCOSE BLDC GLUCOMTR-MCNC: 120 MG/DL — HIGH (ref 70–99)
GLUCOSE BLDC GLUCOMTR-MCNC: 97 MG/DL — SIGNIFICANT CHANGE UP (ref 70–99)
GLUCOSE SERPL-MCNC: 115 MG/DL — HIGH (ref 70–99)
HCT VFR BLD CALC: 34.5 % — LOW (ref 39–50)
HGB BLD-MCNC: 12.2 G/DL — LOW (ref 13–17)
MCHC RBC-ENTMCNC: 32.4 PG — SIGNIFICANT CHANGE UP (ref 27–34)
MCHC RBC-ENTMCNC: 35.2 GM/DL — SIGNIFICANT CHANGE UP (ref 32–36)
MCV RBC AUTO: 92 FL — SIGNIFICANT CHANGE UP (ref 80–100)
PLATELET # BLD AUTO: 143 K/UL — LOW (ref 150–400)
POTASSIUM SERPL-MCNC: 4.3 MMOL/L — SIGNIFICANT CHANGE UP (ref 3.5–5.3)
POTASSIUM SERPL-SCNC: 4.3 MMOL/L — SIGNIFICANT CHANGE UP (ref 3.5–5.3)
RBC # BLD: 3.75 M/UL — LOW (ref 4.2–5.8)
RBC # FLD: 11.9 % — SIGNIFICANT CHANGE UP (ref 10.3–14.5)
SODIUM SERPL-SCNC: 138 MMOL/L — SIGNIFICANT CHANGE UP (ref 135–145)
WBC # BLD: 14.8 K/UL — HIGH (ref 3.8–10.5)
WBC # FLD AUTO: 14.8 K/UL — HIGH (ref 3.8–10.5)

## 2018-02-03 PROCEDURE — 99233 SBSQ HOSP IP/OBS HIGH 50: CPT

## 2018-02-03 PROCEDURE — 71045 X-RAY EXAM CHEST 1 VIEW: CPT | Mod: 26,76

## 2018-02-03 RX ORDER — BENZOCAINE AND MENTHOL 5; 1 G/100ML; G/100ML
1 LIQUID ORAL EVERY 4 HOURS
Qty: 0 | Refills: 0 | Status: DISCONTINUED | OUTPATIENT
Start: 2018-02-03 | End: 2018-02-05

## 2018-02-03 RX ORDER — LIDOCAINE 4 G/100G
1 CREAM TOPICAL DAILY
Qty: 0 | Refills: 0 | Status: DISCONTINUED | OUTPATIENT
Start: 2018-02-03 | End: 2018-02-05

## 2018-02-03 RX ADMIN — OXYCODONE HYDROCHLORIDE 10 MILLIGRAM(S): 5 TABLET ORAL at 17:39

## 2018-02-03 RX ADMIN — OXYCODONE HYDROCHLORIDE 10 MILLIGRAM(S): 5 TABLET ORAL at 21:25

## 2018-02-03 RX ADMIN — OXYCODONE HYDROCHLORIDE 10 MILLIGRAM(S): 5 TABLET ORAL at 08:49

## 2018-02-03 RX ADMIN — Medication 50 MILLIGRAM(S): at 17:06

## 2018-02-03 RX ADMIN — Medication 975 MILLIGRAM(S): at 14:30

## 2018-02-03 RX ADMIN — ENOXAPARIN SODIUM 40 MILLIGRAM(S): 100 INJECTION SUBCUTANEOUS at 11:28

## 2018-02-03 RX ADMIN — LIDOCAINE 1 PATCH: 4 CREAM TOPICAL at 14:30

## 2018-02-03 RX ADMIN — Medication 325 MILLIGRAM(S): at 11:29

## 2018-02-03 RX ADMIN — Medication 975 MILLIGRAM(S): at 21:26

## 2018-02-03 RX ADMIN — SODIUM CHLORIDE 3 MILLILITER(S): 9 INJECTION INTRAMUSCULAR; INTRAVENOUS; SUBCUTANEOUS at 05:30

## 2018-02-03 RX ADMIN — Medication 975 MILLIGRAM(S): at 15:00

## 2018-02-03 RX ADMIN — OXYCODONE HYDROCHLORIDE 10 MILLIGRAM(S): 5 TABLET ORAL at 12:45

## 2018-02-03 RX ADMIN — LIDOCAINE 1 PATCH: 4 CREAM TOPICAL at 05:33

## 2018-02-03 RX ADMIN — Medication 975 MILLIGRAM(S): at 21:55

## 2018-02-03 RX ADMIN — Medication 100 MILLIGRAM(S): at 14:30

## 2018-02-03 RX ADMIN — Medication 975 MILLIGRAM(S): at 06:10

## 2018-02-03 RX ADMIN — OXYCODONE HYDROCHLORIDE 10 MILLIGRAM(S): 5 TABLET ORAL at 13:15

## 2018-02-03 RX ADMIN — OXYCODONE HYDROCHLORIDE 10 MILLIGRAM(S): 5 TABLET ORAL at 03:03

## 2018-02-03 RX ADMIN — OXYCODONE HYDROCHLORIDE 10 MILLIGRAM(S): 5 TABLET ORAL at 02:32

## 2018-02-03 RX ADMIN — Medication 100 MILLIGRAM(S): at 21:26

## 2018-02-03 RX ADMIN — OXYCODONE HYDROCHLORIDE 10 MILLIGRAM(S): 5 TABLET ORAL at 17:06

## 2018-02-03 RX ADMIN — SODIUM CHLORIDE 3 MILLILITER(S): 9 INJECTION INTRAMUSCULAR; INTRAVENOUS; SUBCUTANEOUS at 14:26

## 2018-02-03 RX ADMIN — ATORVASTATIN CALCIUM 80 MILLIGRAM(S): 80 TABLET, FILM COATED ORAL at 21:26

## 2018-02-03 RX ADMIN — OXYCODONE HYDROCHLORIDE 10 MILLIGRAM(S): 5 TABLET ORAL at 09:20

## 2018-02-03 RX ADMIN — POLYETHYLENE GLYCOL 3350 17 GRAM(S): 17 POWDER, FOR SOLUTION ORAL at 14:31

## 2018-02-03 RX ADMIN — Medication 50 MILLIGRAM(S): at 05:32

## 2018-02-03 RX ADMIN — SODIUM CHLORIDE 3 MILLILITER(S): 9 INJECTION INTRAMUSCULAR; INTRAVENOUS; SUBCUTANEOUS at 21:24

## 2018-02-03 RX ADMIN — Medication 975 MILLIGRAM(S): at 05:32

## 2018-02-03 RX ADMIN — PANTOPRAZOLE SODIUM 40 MILLIGRAM(S): 20 TABLET, DELAYED RELEASE ORAL at 05:34

## 2018-02-03 RX ADMIN — LIDOCAINE 1 PATCH: 4 CREAM TOPICAL at 17:39

## 2018-02-03 RX ADMIN — OXYCODONE HYDROCHLORIDE 10 MILLIGRAM(S): 5 TABLET ORAL at 21:55

## 2018-02-03 RX ADMIN — Medication 100 MILLIGRAM(S): at 05:32

## 2018-02-03 NOTE — PROGRESS NOTE ADULT - PROBLEM SELECTOR PLAN 1
Continue with  mg po daily   Continue with Lopressor   Continue with statin  Diuretics   Up-titrate beta-blocker as tolerated.  OOB to chair, Ambulate daily as tolerated.   Encouraged cough and deep breathe, Incentive Spirometry Q1h, Chest PT.   continue with GI prophylaxis on protonix and DVT prophylaxis on SQ Lovenox.   Maintain Left Pleural CT --> clamped at 8 am --> repeat CXR at 12:00 pm; if no PTX likely will remove left CT as per Dr. Cantu  Disposition: Home when medically cleared.

## 2018-02-03 NOTE — PROGRESS NOTE ADULT - SUBJECTIVE AND OBJECTIVE BOX
VITAL SIGNS    Subjective: " I'm feeling better today; I really want this tube out"     Telemetry: NSR      Vital Signs Last 24 Hrs  T(C): 36.4 (18 @ 13:05), Max: 37.3 (18 @ 05:23)  T(F): 97.6 (18 @ 13:05), Max: 99.2 (18 @ 05:23)  HR: 95 (18 @ 13:05) (95 - 102)  BP: 114/74 (18 @ 13:05) (108/73 - 128/83)  RR: 18 (18 @ 13:05) (18 - 20)  SpO2: 94% (18 @ 13:05) (90% - 94%)            @ 07:01  -   @ 07:00  --------------------------------------------------------  IN: 760 mL / OUT: 1990 mL / NET: -1230 mL     @ 07:01  -   @ 13:06  --------------------------------------------------------  IN: 360 mL / OUT: 400 mL / NET: -40 mL    Daily     Daily Weight in k.5 (2018 08:41)      CAPILLARY BLOOD GLUCOSE      POCT Blood Glucose.: 105 mg/dL (2018 11:28)  POCT Blood Glucose.: 111 mg/dL (2018 07:18)  POCT Blood Glucose.: 122 mg/dL (2018 21:37)  POCT Blood Glucose.: 122 mg/dL (2018 16:24)          Drains: L Pleural  [ X ]  Drainage: Pleural vac --> LWS; negative air leak              R Pleural  [  ]  Drainage:    Pacing Wires        [  ]   Settings:                                  Isolated  [ X ]    PHYSICAL EXAM      Neurology: alert and oriented x 3, nonfocal, no gross deficits    CV: (+) S1 and S2, No murmurs, rubs, gallops or clicks     Sternal Wound:  MSI -->CDI , sternum stable    Lungs: CTA B/L     Abdomen: soft, nontender, nondistended, positive bowel sounds, (+) Flatus; (+) BM     :  Voiding               Extremities:  B/L LE (+) edema; negative calf tenderness; (+) 2 DP palpable        acetaminophen   Tablet. 975 milliGRAM(s) Oral every 8 hours  aspirin enteric coated 325 milliGRAM(s) Oral daily  atorvastatin 80 milliGRAM(s) Oral at bedtime  benzocaine 15 mG/menthol 3.6 mG Lozenge 1 Lozenge Oral every 4 hours PRN  docusate sodium 100 milliGRAM(s) Oral three times a day  enoxaparin Injectable 40 milliGRAM(s) SubCutaneous daily  insulin lispro (HumaLOG) corrective regimen sliding scale   SubCutaneous Before meals and at bedtime  lidocaine   Patch 1 Patch Transdermal daily  metoprolol  tartrate 50 milliGRAM(s) Oral two times a day  oxyCODONE  IR 10 milliGRAM(s) Oral every 4 hours PRN  oxyCODONE IR 5 milliGRAM(s) Oral every 4 hours PRN  pantoprazole  Tablet 40 milliGRAM(s) Oral before breakfast  polyethylene glycol 3350 17 Gram(s) Oral daily    Physical Therapy Rec:   Home  [  ]   Home w/ PT  [ X]  Rehab  [  ]    Discussed with Cardiothoracic Team at AM rounds. VITAL SIGNS    Subjective: " I'm feeling better today; I really want this tube out"     Telemetry: NSR      Vital Signs Last 24 Hrs  T(C): 36.4 (18 @ 13:05), Max: 37.3 (18 @ 05:23)  T(F): 97.6 (18 @ 13:05), Max: 99.2 (18 @ 05:23)  HR: 95 (18 @ 13:05) (95 - 102)  BP: 114/74 (18 @ 13:05) (108/73 - 128/83)  RR: 18 (18 @ 13:05) (18 - 20)  SpO2: 94% (18 @ 13:05) (90% - 94%)            @ 07:01  -   @ 07:00  --------------------------------------------------------  IN: 760 mL / OUT: 1990 mL / NET: -1230 mL     @ 07:01  -   @ 13:06  --------------------------------------------------------  IN: 360 mL / OUT: 400 mL / NET: -40 mL    Daily     Daily Weight in k.5 (2018 08:41)      CAPILLARY BLOOD GLUCOSE      POCT Blood Glucose.: 105 mg/dL (2018 11:28)  POCT Blood Glucose.: 111 mg/dL (2018 07:18)  POCT Blood Glucose.: 122 mg/dL (2018 21:37)  POCT Blood Glucose.: 122 mg/dL (2018 16:24)          Drains: L Pleural  [ X ]  Drainage: Pleural vac --> LWS; negative air leak              R Pleural  [  ]  Drainage:    Pacing Wires        [  ]   Settings:                                  Isolated  [ X ]    PHYSICAL EXAM      Neurology: alert and oriented x 3, nonfocal, no gross deficits    CV: (+) S1 and S2, No murmurs, rubs, gallops or clicks     Sternal Wound:  MSI -->CDI , sternum stable; Left neck subcutaneus emphysema     Lungs: CTA B/L     Abdomen: soft, nontender, nondistended, positive bowel sounds, (+) Flatus; (+) BM     :  Voiding               Extremities:  B/L LE (+) edema; negative calf tenderness; (+) 2 DP palpable       acetaminophen   Tablet. 975 milliGRAM(s) Oral every 8 hours  aspirin enteric coated 325 milliGRAM(s) Oral daily  atorvastatin 80 milliGRAM(s) Oral at bedtime  benzocaine 15 mG/menthol 3.6 mG Lozenge 1 Lozenge Oral every 4 hours PRN  docusate sodium 100 milliGRAM(s) Oral three times a day  enoxaparin Injectable 40 milliGRAM(s) SubCutaneous daily  insulin lispro (HumaLOG) corrective regimen sliding scale   SubCutaneous Before meals and at bedtime  lidocaine   Patch 1 Patch Transdermal daily  metoprolol  tartrate 50 milliGRAM(s) Oral two times a day  oxyCODONE  IR 10 milliGRAM(s) Oral every 4 hours PRN  oxyCODONE IR 5 milliGRAM(s) Oral every 4 hours PRN  pantoprazole  Tablet 40 milliGRAM(s) Oral before breakfast  polyethylene glycol 3350 17 Gram(s) Oral daily    Physical Therapy Rec:   Home  [  ]   Home w/ PT  [ X]  Rehab  [  ]    Discussed with Cardiothoracic Team at AM rounds.

## 2018-02-03 NOTE — PROGRESS NOTE ADULT - ASSESSMENT
53 y/o male with h/o HTN, HLD, LOAN not on CPAP and GERD with c/o mid-sternal pain x 3 days and belching 12/2017. Pt was seen in ED and underwent nuclear stress test, which revealed fixed inferior wall defect. Cardiac cath revealed left main and RCA coronary stenosis. IVUS confirmed stenosis of mid-distal left main.   Pt now s/p C3L on 1/31/2018.  Post-op Course:  2/1 transferred to floor, S. POD#1. Mediastinal CT dc'd. Desiree dc'd-DTV.  2/2 Left neck (+) subcutaneous emphysema --> CXR New left neck subcutaneous emphysema, likely secondary to pneumomediastinum. Small left pleural effusion. No pneumothorax. The cardiomediastinal silhouette is not well evaluated on this projection. Status post median sternotomy. IMPRESSION:  Small left pleural effusion. New subcutaneous emphysema in the left neck, likely secondary to pneumomediastinum. Left Pleural CT --> LWS ; Leukocytosis --> likely reactive; continue to monitor   2/3 Left pleural CT clamped at 8 am --> Plan to repeat CXR at 12:00 pm; if no PTX likely will remove left CT as per Dr. Cantu / D/C PW   Disposition: Home PT

## 2018-02-03 NOTE — PROGRESS NOTE ADULT - SUBJECTIVE AND OBJECTIVE BOX
53 yo male with h/o HTN, HLD, LOAN and GERD.  Adm for mid-sternal pain and belching 2017. Pt underwent nuclear stress test, which revealed fixed inferior wall defect. Cardiac cath revealed left main and RCA coronary stenosis. IVUS confirmed stenosis of mid-distal left main.   Now s/p CABG.    At present,             HPI: He feels fine. Some swelling in his left neck.  No chest pain or leg pain.    The medical history is unchanged.  Review Of Systems:  No orthopnea or change in leg edema. The remainder of his ROS is unchanged.      Medications:  acetaminophen   Tablet. 975 milliGRAM(s) Oral every 8 hours  aspirin enteric coated 325 milliGRAM(s) Oral daily  atorvastatin 80 milliGRAM(s) Oral at bedtime  benzocaine 15 mG/menthol 3.6 mG Lozenge 1 Lozenge Oral every 4 hours PRN  docusate sodium 100 milliGRAM(s) Oral three times a day  enoxaparin Injectable 40 milliGRAM(s) SubCutaneous daily  insulin lispro (HumaLOG) corrective regimen sliding scale   SubCutaneous Before meals and at bedtime  lidocaine   Patch 1 Patch Transdermal daily  metoprolol     tartrate 50 milliGRAM(s) Oral two times a day  oxyCODONE    IR 10 milliGRAM(s) Oral every 4 hours PRN  oxyCODONE    IR 5 milliGRAM(s) Oral every 4 hours PRN  pantoprazole    Tablet 40 milliGRAM(s) Oral before breakfast  polyethylene glycol 3350 17 Gram(s) Oral daily  sodium chloride 0.9% lock flush 3 milliLiter(s) IV Push every 8 hours  sodium chloride 0.9%. 1000 milliLiter(s) IV Continuous <Continuous>    PMH/PSH/FH/SH: [x ] Unchanged    Vitals:  T(C): 37.3 (18 @ 05:23), Max: 37.3 (18 @ 05:23)  HR: 100 (18 @ 05:23) (97 - 102)  BP: 108/73 (18 @ 05:23) (108/73 - 128/83)  RR: 18 (18 @ 06:00) (18 - 20)  SpO2: 94% (18 @ 06:00) (90% - 94%)  Daily Weight in k.5 (2018 08:41)    Physical Exam:  Appearance: NAD  HEENT: No icterus or JVD  Cardiovascular: Regular rhythm, normal S1, S2, No murmurs or rubs, No edema  Respiratory: Decreased BS at bases b/l.  Gastrointestinal: +BS, soft  Musculoskeletal: No clubbing  Neurologic: Non-focal, alert and oriented, Mood & affect appropriate  Lymphatic: No lymphadenopathy  Skin: Warm and moist, no cyanosis    TELE:    I&O's Summary    2018 07:01  -  2018 07:00  --------------------------------------------------------  IN: 760 mL / OUT: 1990 mL / NET: -1230 mL    2018 07:01  -  2018 11:45  --------------------------------------------------------  IN: 360 mL / OUT: 400 mL / NET: -40 mL      LABS:                          12.2   14.8  )-----------( 143      ( 2018 06:24 )             34.5       02-    138  |  101  |  17  ----------------------------<  115<H>  4.3   |  26  |  1.03    Ca    8.9      2018 06:24        · Assessment		  52 year old with angina and severe CAD s/p C3L with LIMA to the LAD, and VGs to the OM and RCA.  Doing well postoperatively. Left chest tube and subcutaneous emphysema to be managed by CTS.  Continue asa/statin/beta-blocker.   Stable anemia and renal function  Ambulate per CTS.        Christopher Avlarez M.D.  (covering for Dr. J. Lisker)  Office: [phone]  Beeper: [phone]           ECG:    Echo:    Stress Testing:     Cath:    Imaging:    Interpretation of Telemetry: 51 yo male with h/o HTN, HLD, LOAN and GERD.  Adm for mid-sternal pain and belching 2017. Pt underwent nuclear stress test, which revealed fixed inferior wall defect. Cardiac cath revealed left main and RCA coronary stenosis. IVUS confirmed stenosis of mid-distal left main.   Now s/p CABG.    At present, he is OOB to chair.  Still with some post-op CP, which he attributes to remaining chest tube.  No dyspnea or palps.   Some swelling in his left neck. No orthopnea.    Remainder of his ROS is unchanged.    Medications:  acetaminophen   Tablet. 975 milliGRAM(s) Oral every 8 hours  aspirin enteric coated 325 milliGRAM(s) Oral daily  atorvastatin 80 milliGRAM(s) Oral at bedtime  benzocaine 15 mG/menthol 3.6 mG Lozenge 1 Lozenge Oral every 4 hours PRN  docusate sodium 100 milliGRAM(s) Oral three times a day  enoxaparin Injectable 40 milliGRAM(s) SubCutaneous daily  insulin lispro (HumaLOG) corrective regimen sliding scale   SubCutaneous Before meals and at bedtime  lidocaine   Patch 1 Patch Transdermal daily  metoprolol     tartrate 50 milliGRAM(s) Oral two times a day  oxyCODONE    IR 10 milliGRAM(s) Oral every 4 hours PRN  oxyCODONE    IR 5 milliGRAM(s) Oral every 4 hours PRN  pantoprazole    Tablet 40 milliGRAM(s) Oral before breakfast  polyethylene glycol 3350 17 Gram(s) Oral daily  sodium chloride 0.9% lock flush 3 milliLiter(s) IV Push every 8 hours  sodium chloride 0.9%. 1000 milliLiter(s) IV Continuous <Continuous>    PMH/PSH/FH/SH: [x ] Unchanged    Vitals:  T(C): 37.3 (18 @ 05:23), Max: 37.3 (18 @ 05:23)  HR: 100 (18 @ 05:23) (97 - 102)  BP: 108/73 (18 @ 05:23) (108/73 - 128/83)  RR: 18 (18 @ 06:00) (18 - 20)  SpO2: 94% (18 @ 06:00) (90% - 94%)  Daily Weight in k.5 (2018 08:41)    Physical Exam:  Appearance: NAD  HEENT: No icterus or JVD  Cardiovascular: Regular rhythm, normal S1, S2, No murmurs or rubs, No edema  Respiratory: Decreased BS at bases b/l.  Gastrointestinal: +BS, soft  Musculoskeletal: No clubbing  Neurologic: Non-focal, alert and oriented, Mood & affect appropriate  Lymphatic: No lymphadenopathy  Skin: Warm and moist, no cyanosis    TELE:  NSR    I&O's Summary    2018 07:01  -  2018 07:00  --------------------------------------------------------  IN: 760 mL / OUT: 1990 mL / NET: -1230 mL    2018 07:01  -  2018 11:45  --------------------------------------------------------  IN: 360 mL / OUT: 400 mL / NET: -40 mL      LABS:                          12.2   14.8  )-----------( 143      ( 2018 06:24 )             34.5       02-    138  |  101  |  17  ----------------------------<  115<H>  4.3   |  26  |  1.03    Ca    8.9      2018 06:24        Assessment		    52 year old with angina and severe CAD s/p C3L with LIMA to the LAD, and SVGs to the OM and RCA.  Doing well postoperatively.    REC:    1.  Left chest tube and subcutaneous emphysema to be managed by CTS.  2.  Continue asa/statin/beta-blocker.   3.  Ambulate per CTS.  4.  Pulmonary toilet, encouraged to use incentive spirometer.    Christopher Alvarez M.D.  (covering for Dr. J. Lisker)  Office: [phone]  Beeper: [phone]

## 2018-02-04 LAB
ANION GAP SERPL CALC-SCNC: 10 MMOL/L — SIGNIFICANT CHANGE UP (ref 5–17)
BUN SERPL-MCNC: 16 MG/DL — SIGNIFICANT CHANGE UP (ref 7–23)
CALCIUM SERPL-MCNC: 8.8 MG/DL — SIGNIFICANT CHANGE UP (ref 8.4–10.5)
CHLORIDE SERPL-SCNC: 102 MMOL/L — SIGNIFICANT CHANGE UP (ref 96–108)
CO2 SERPL-SCNC: 25 MMOL/L — SIGNIFICANT CHANGE UP (ref 22–31)
CREAT SERPL-MCNC: 1.03 MG/DL — SIGNIFICANT CHANGE UP (ref 0.5–1.3)
GLUCOSE BLDC GLUCOMTR-MCNC: 102 MG/DL — HIGH (ref 70–99)
GLUCOSE BLDC GLUCOMTR-MCNC: 102 MG/DL — HIGH (ref 70–99)
GLUCOSE BLDC GLUCOMTR-MCNC: 104 MG/DL — HIGH (ref 70–99)
GLUCOSE BLDC GLUCOMTR-MCNC: 106 MG/DL — HIGH (ref 70–99)
GLUCOSE SERPL-MCNC: 113 MG/DL — HIGH (ref 70–99)
HCT VFR BLD CALC: 33.4 % — LOW (ref 39–50)
HGB BLD-MCNC: 11.8 G/DL — LOW (ref 13–17)
MCHC RBC-ENTMCNC: 32.4 PG — SIGNIFICANT CHANGE UP (ref 27–34)
MCHC RBC-ENTMCNC: 35.5 GM/DL — SIGNIFICANT CHANGE UP (ref 32–36)
MCV RBC AUTO: 91.3 FL — SIGNIFICANT CHANGE UP (ref 80–100)
PLATELET # BLD AUTO: 160 K/UL — SIGNIFICANT CHANGE UP (ref 150–400)
POTASSIUM SERPL-MCNC: 3.7 MMOL/L — SIGNIFICANT CHANGE UP (ref 3.5–5.3)
POTASSIUM SERPL-SCNC: 3.7 MMOL/L — SIGNIFICANT CHANGE UP (ref 3.5–5.3)
RBC # BLD: 3.65 M/UL — LOW (ref 4.2–5.8)
RBC # FLD: 11.6 % — SIGNIFICANT CHANGE UP (ref 10.3–14.5)
SODIUM SERPL-SCNC: 137 MMOL/L — SIGNIFICANT CHANGE UP (ref 135–145)
WBC # BLD: 11.9 K/UL — HIGH (ref 3.8–10.5)
WBC # FLD AUTO: 11.9 K/UL — HIGH (ref 3.8–10.5)

## 2018-02-04 PROCEDURE — 99232 SBSQ HOSP IP/OBS MODERATE 35: CPT

## 2018-02-04 RX ORDER — ONDANSETRON 8 MG/1
4 TABLET, FILM COATED ORAL ONCE
Qty: 0 | Refills: 0 | Status: COMPLETED | OUTPATIENT
Start: 2018-02-04 | End: 2018-02-04

## 2018-02-04 RX ORDER — POTASSIUM CHLORIDE 20 MEQ
20 PACKET (EA) ORAL
Qty: 0 | Refills: 0 | Status: COMPLETED | OUTPATIENT
Start: 2018-02-04 | End: 2018-02-04

## 2018-02-04 RX ADMIN — OXYCODONE HYDROCHLORIDE 10 MILLIGRAM(S): 5 TABLET ORAL at 05:33

## 2018-02-04 RX ADMIN — Medication 975 MILLIGRAM(S): at 22:39

## 2018-02-04 RX ADMIN — OXYCODONE HYDROCHLORIDE 10 MILLIGRAM(S): 5 TABLET ORAL at 17:20

## 2018-02-04 RX ADMIN — Medication 975 MILLIGRAM(S): at 14:13

## 2018-02-04 RX ADMIN — ATORVASTATIN CALCIUM 80 MILLIGRAM(S): 80 TABLET, FILM COATED ORAL at 22:09

## 2018-02-04 RX ADMIN — Medication 325 MILLIGRAM(S): at 11:29

## 2018-02-04 RX ADMIN — SODIUM CHLORIDE 3 MILLILITER(S): 9 INJECTION INTRAMUSCULAR; INTRAVENOUS; SUBCUTANEOUS at 13:33

## 2018-02-04 RX ADMIN — POLYETHYLENE GLYCOL 3350 17 GRAM(S): 17 POWDER, FOR SOLUTION ORAL at 11:29

## 2018-02-04 RX ADMIN — Medication 100 MILLIGRAM(S): at 05:33

## 2018-02-04 RX ADMIN — OXYCODONE HYDROCHLORIDE 10 MILLIGRAM(S): 5 TABLET ORAL at 22:39

## 2018-02-04 RX ADMIN — Medication 975 MILLIGRAM(S): at 06:03

## 2018-02-04 RX ADMIN — Medication 975 MILLIGRAM(S): at 05:33

## 2018-02-04 RX ADMIN — OXYCODONE HYDROCHLORIDE 10 MILLIGRAM(S): 5 TABLET ORAL at 09:57

## 2018-02-04 RX ADMIN — OXYCODONE HYDROCHLORIDE 10 MILLIGRAM(S): 5 TABLET ORAL at 06:03

## 2018-02-04 RX ADMIN — Medication 20 MILLIEQUIVALENT(S): at 11:29

## 2018-02-04 RX ADMIN — OXYCODONE HYDROCHLORIDE 10 MILLIGRAM(S): 5 TABLET ORAL at 17:50

## 2018-02-04 RX ADMIN — OXYCODONE HYDROCHLORIDE 10 MILLIGRAM(S): 5 TABLET ORAL at 01:16

## 2018-02-04 RX ADMIN — SODIUM CHLORIDE 3 MILLILITER(S): 9 INJECTION INTRAMUSCULAR; INTRAVENOUS; SUBCUTANEOUS at 22:01

## 2018-02-04 RX ADMIN — OXYCODONE HYDROCHLORIDE 10 MILLIGRAM(S): 5 TABLET ORAL at 10:27

## 2018-02-04 RX ADMIN — OXYCODONE HYDROCHLORIDE 10 MILLIGRAM(S): 5 TABLET ORAL at 22:09

## 2018-02-04 RX ADMIN — Medication 975 MILLIGRAM(S): at 22:09

## 2018-02-04 RX ADMIN — Medication 100 MILLIGRAM(S): at 14:12

## 2018-02-04 RX ADMIN — LIDOCAINE 1 PATCH: 4 CREAM TOPICAL at 05:34

## 2018-02-04 RX ADMIN — LIDOCAINE 1 PATCH: 4 CREAM TOPICAL at 17:07

## 2018-02-04 RX ADMIN — SODIUM CHLORIDE 3 MILLILITER(S): 9 INJECTION INTRAMUSCULAR; INTRAVENOUS; SUBCUTANEOUS at 05:32

## 2018-02-04 RX ADMIN — ENOXAPARIN SODIUM 40 MILLIGRAM(S): 100 INJECTION SUBCUTANEOUS at 11:29

## 2018-02-04 RX ADMIN — PANTOPRAZOLE SODIUM 40 MILLIGRAM(S): 20 TABLET, DELAYED RELEASE ORAL at 05:33

## 2018-02-04 RX ADMIN — Medication 50 MILLIGRAM(S): at 05:33

## 2018-02-04 RX ADMIN — Medication 50 MILLIGRAM(S): at 17:20

## 2018-02-04 RX ADMIN — Medication 100 MILLIGRAM(S): at 22:09

## 2018-02-04 RX ADMIN — Medication 975 MILLIGRAM(S): at 14:43

## 2018-02-04 RX ADMIN — ONDANSETRON 4 MILLIGRAM(S): 8 TABLET, FILM COATED ORAL at 09:26

## 2018-02-04 RX ADMIN — Medication 20 MILLIEQUIVALENT(S): at 09:13

## 2018-02-04 RX ADMIN — OXYCODONE HYDROCHLORIDE 10 MILLIGRAM(S): 5 TABLET ORAL at 01:46

## 2018-02-04 NOTE — PROGRESS NOTE ADULT - PROBLEM SELECTOR PLAN 2
C/W standing Tylenol  C/W oxycodone 5/10 IR prn and Dilaudid for breakthrough pain.  C/W bowel regimen.
Continue with standing Tylenol  Continue with oxycodone 5/10 IR prn and Dilaudid for breakthrough pain.  Continue with  bowel regimen.
Continue with standing Tylenol  Continue with oxycodone 5/10 IR prn and Dilaudid for breakthrough pain.  Continue with  bowel regimen.
GI prophylaxis with protonix
C/W standing tylenol  C/W oxycodone 5/10 IR prn and dilaudid for breakthru pain.  C/W bowel regimen.

## 2018-02-04 NOTE — PROGRESS NOTE ADULT - SUBJECTIVE AND OBJECTIVE BOX
HPI:  53 yo male with h/o HTN, HLD, LOAN and GERD with c/o mid-sternal pain x 3 days and belching 2017. Pt was seen in ED and underwent nuclear stress test, which revealed fixed inferior wall defect. Cardiac cath revealed left main and RCA coronary stenosis. IVUS confirmed stenosis of mid-distal left main. Pt is scheduled for CABG x 3 on 2018. (2018 14:37)      At present, he is OOB to chair.  Still with some post-op CP, which he attributes to remaining chest tube.  No dyspnea or palps.   Some swelling in his left neck. No orthopnea.    Remainder of his ROS is unchanged.        Medications:  acetaminophen   Tablet. 975 milliGRAM(s) Oral every 8 hours  aspirin enteric coated 325 milliGRAM(s) Oral daily  atorvastatin 80 milliGRAM(s) Oral at bedtime  benzocaine 15 mG/menthol 3.6 mG Lozenge 1 Lozenge Oral every 4 hours PRN  docusate sodium 100 milliGRAM(s) Oral three times a day  enoxaparin Injectable 40 milliGRAM(s) SubCutaneous daily  insulin lispro (HumaLOG) corrective regimen sliding scale   SubCutaneous Before meals and at bedtime  lidocaine   Patch 1 Patch Transdermal daily  metoprolol     tartrate 50 milliGRAM(s) Oral two times a day  oxyCODONE    IR 10 milliGRAM(s) Oral every 4 hours PRN  oxyCODONE    IR 5 milliGRAM(s) Oral every 4 hours PRN  pantoprazole    Tablet 40 milliGRAM(s) Oral before breakfast  polyethylene glycol 3350 17 Gram(s) Oral daily  sodium chloride 0.9% lock flush 3 milliLiter(s) IV Push every 8 hours  sodium chloride 0.9%. 1000 milliLiter(s) IV Continuous <Continuous>    PMH/PSH/FH/SH: Unchanged      Vitals:  T(C): 36.4 (18 @ 05:17), Max: 36.6 (18 @ 20:05)  HR: 92 (18 @ 05:17) (92 - 95)  BP: 113/80 (18 @ 05:17) (113/80 - 116/81)  RR: 18 (18 @ 05:17) (18 - 18)  SpO2: 92% (18 @ 05:17) (92% - 97%)  Daily Weight in k.1 (2018 08:18)        Physical Exam:  Appearance: NAD  HEENT: No icterus or JVD  Cardiovascular: Regular rhythm, normal S1, S2, No murmurs or rubs, No edema  Respiratory: Decreased BS at bases b/l.  Gastrointestinal: +BS, soft  Musculoskeletal: No clubbing  Neurologic: Non-focal, alert and oriented, Mood & affect appropriate  Lymphatic: No lymphadenopathy  Skin: Warm and moist, no cyanosis    TELE:  NSR      I&O's Summary    2018 07:01  -  2018 07:00  --------------------------------------------------------  IN: 1180 mL / OUT: 870 mL / NET: 310 mL    2018 07:01  -  2018 12:31  --------------------------------------------------------  IN: 360 mL / OUT: 450 mL / NET: -90 mL    LABS:                        11.8   11.9  )-----------( 160      ( 2018 06:44 )             33.4       02-04    137  |  102  |  16  ----------------------------<  113<H>  3.7   |  25  |  1.03    Ca    8.8      2018 06:44          Assessment		    52 year old with angina and severe CAD s/p C3L with LIMA to the LAD, and SVGs to the OM and RCA.  Doing well postoperatively.    REC:    1.  Left chest tube and subcutaneous emphysema to be managed by CTS.  2.  Continue asa/statin/beta-blocker.   3.  Ambulate per CTS.  4.  Pulmonary toilet, encouraged to use incentive spirometer.    Christopher Alvarez M.D.  (covering for Dr. J. Lisker)  Office: [phone]  Beeper: [phone] HPI:  53 yo male with h/o HTN, HLD, LOAN and GERD with c/o mid-sternal pain x 3 days and belching 2017. Pt was seen in ED and underwent nuclear stress test, which revealed fixed inferior wall defect. Cardiac cath revealed left main and RCA coronary stenosis. IVUS confirmed stenosis of mid-distal left main. Pt is scheduled for CABG x 3 on 2018. (2018 14:37)    OOB to chair.  Chest tube removed, CP better.  No dyspnea or palps; no orthopnea.    Remainder of his ROS is unchanged.    Medications:  acetaminophen   Tablet. 975 milliGRAM(s) Oral every 8 hours  aspirin enteric coated 325 milliGRAM(s) Oral daily  atorvastatin 80 milliGRAM(s) Oral at bedtime  benzocaine 15 mG/menthol 3.6 mG Lozenge 1 Lozenge Oral every 4 hours PRN  docusate sodium 100 milliGRAM(s) Oral three times a day  enoxaparin Injectable 40 milliGRAM(s) SubCutaneous daily  insulin lispro (HumaLOG) corrective regimen sliding scale   SubCutaneous Before meals and at bedtime  lidocaine   Patch 1 Patch Transdermal daily  metoprolol     tartrate 50 milliGRAM(s) Oral two times a day  oxyCODONE    IR 10 milliGRAM(s) Oral every 4 hours PRN  oxyCODONE    IR 5 milliGRAM(s) Oral every 4 hours PRN  pantoprazole    Tablet 40 milliGRAM(s) Oral before breakfast  polyethylene glycol 3350 17 Gram(s) Oral daily  sodium chloride 0.9% lock flush 3 milliLiter(s) IV Push every 8 hours  sodium chloride 0.9%. 1000 milliLiter(s) IV Continuous <Continuous>    PMH/PSH/FH/SH: Unchanged    Vitals:  T(C): 36.4 (18 @ 05:17), Max: 36.6 (18 @ 20:05)  HR: 92 (18 @ 05:17) (92 - 95)  BP: 113/80 (18 @ 05:17) (113/80 - 116/81)  RR: 18 (18 @ 05:17) (18 - 18)  SpO2: 92% (18 @ 05:17) (92% - 97%)  Daily Weight in k.1 (2018 08:18)    Physical Exam:  Appearance: NAD  HEENT: No icterus or JVD  Cardiovascular: Regular rhythm, normal S1, S2, No murmurs or rubs, No edema  Respiratory: Decreased BS at bases b/l.  Gastrointestinal: +BS, soft  Musculoskeletal: No clubbing  Neurologic: Non-focal, alert and oriented, Mood & affect appropriate  Lymphatic: No lymphadenopathy  Skin: Warm and moist, no cyanosis    TELE:  NSR, 80 - 110 bpm    I&O's Summary    2018 07:01  -  2018 07:00  --------------------------------------------------------  IN: 1180 mL / OUT: 870 mL / NET: 310 mL    2018 07:01  -  2018 12:31  --------------------------------------------------------  IN: 360 mL / OUT: 450 mL / NET: -90 mL      LABS:                        11.8   11.9  )-----------( 160      ( 2018 06:44 )             33.4       02-04    137  |  102  |  16  ----------------------------<  113<H>  3.7   |  25  |  1.03    Ca    8.8      2018 06:44          Assessment		    52 year old with angina and severe CAD s/p C3L with LIMA to the LAD, and SVGs to the OM and RCA.  Doing well postoperatively.    REC:    1.  Continue asa/statin/beta-blocker.   2.  OOB as tolerated, encourage ambulation.  3.  Pulmonary toilet, incentive spirometer.    Christopher Alvarez M.D.  (covering for Dr. J. Lisker)  Office: [phone]  Beeper: [phone]

## 2018-02-04 NOTE — PROGRESS NOTE ADULT - ASSESSMENT
51 y/o male with h/o HTN, HLD, LOAN not on CPAP and GERD with c/o mid-sternal pain x 3 days and belching 12/2017. Pt was seen in ED and underwent nuclear stress test, which revealed fixed inferior wall defect. Cardiac cath revealed left main and RCA coronary stenosis. IVUS confirmed stenosis of mid-distal left main.   Pt now s/p C3L on 1/31/2018.  Post-op Course:  2/1 transferred to floor, S. POD#1. Mediastinal CT dc'd. Desiree dc'd-DTV.  2/2 Left neck (+) subcutaneous emphysema --> CXR New left neck subcutaneous emphysema, likely secondary to pneumomediastinum. Small left pleural effusion. No pneumothorax. The cardiomediastinal silhouette is not well evaluated on this projection. Status post median sternotomy. IMPRESSION:  Small left pleural effusion. New subcutaneous emphysema in the left neck, likely secondary to pneumomediastinum. Left Pleural CT --> LWS ; Leukocytosis --> likely reactive; continue to monitor   2/3 Left pleural CT clamped at 8 am --> Plan to repeat CXR at 12:00 pm; if no PTX likely will remove left CT as per Dr. Cantu / D/C PW   2/3 Supplement K  Disposition: Home PT

## 2018-02-04 NOTE — PROGRESS NOTE ADULT - SUBJECTIVE AND OBJECTIVE BOX
VITAL SIGNS    Telemetry:  SR   Vital Signs Last 24 Hrs  T(C): 36.4 (18 @ 05:17), Max: 36.6 (18 @ 20:05)  T(F): 97.6 (18 @ 05:17), Max: 97.9 (18 @ 20:05)  HR: 92 (18 @ 05:17) (92 - 95)  BP: 113/80 (18 @ 05:17) (113/80 - 116/81)  RR: 18 (18 @ 05:17) (18 - 18)  SpO2: 92% (18 @ 05:17) (92% - 97%)             @ 07:01  -   @ 07:00  --------------------------------------------------------  IN: 1180 mL / OUT: 870 mL / NET: 310 mL      Daily Weight in k.1 (2018 08:18)  Admit Wt: Drug Dosing Weight  Height (cm): 187.96 (2018 07:35)  Weight (kg): 102.7 (2018 07:35)  BMI (kg/m2): 29.1 (2018 07:35)  BSA (m2): 2.29 (2018 07:35)    MEDICATIONS  acetaminophen   Tablet. 975 milliGRAM(s) Oral every 8 hours  aspirin enteric coated 325 milliGRAM(s) Oral daily  atorvastatin 80 milliGRAM(s) Oral at bedtime  benzocaine 15 mG/menthol 3.6 mG Lozenge 1 Lozenge Oral every 4 hours PRN  docusate sodium 100 milliGRAM(s) Oral three times a day  enoxaparin Injectable 40 milliGRAM(s) SubCutaneous daily  insulin lispro (HumaLOG) corrective regimen sliding scale   SubCutaneous Before meals and at bedtime  lidocaine   Patch 1 Patch Transdermal daily  metoprolol     tartrate 50 milliGRAM(s) Oral two times a day  oxyCODONE    IR 10 milliGRAM(s) Oral every 4 hours PRN  oxyCODONE    IR 5 milliGRAM(s) Oral every 4 hours PRN  pantoprazole    Tablet 40 milliGRAM(s) Oral before breakfast  polyethylene glycol 3350 17 Gram(s) Oral daily  potassium chloride    Tablet ER 20 milliEquivalent(s) Oral every 2 hours  sodium chloride 0.9% lock flush 3 milliLiter(s) IV Push every 8 hours  sodium chloride 0.9%. 1000 milliLiter(s) IV Continuous <Continuous>    PHYSICAL EXAM  Subjective: NAD  Neurology: alert and oriented x 3, nonfocal, no gross deficits  CV : S1S2  Sternal Wound :  CDI , Stable  Lungs: CTA b/l  Abdomen: soft, NT,ND, (+ )BM  :  voiding  Extremities:  -c/c/e     LABS  -    137  |  102  |  16  ----------------------------<  113<H>  3.7   |  25  |  1.03    Ca    8.8      2018 06:44                                   11.8   11.9  )-----------( 160      ( 2018 06:44 )             33.4                 PAST MEDICAL & SURGICAL HISTORY:  PFO (patent foramen ovale): per CT Heart with coronaries 2017 - &quot;there is a patent foramen ovale with a small volume of left to right flow of contrast agent.&quot;  BPH (benign prostatic hyperplasia)  CAD (coronary artery disease)  LOAN (obstructive sleep apnea): noncompliant with dental device  Gastroesophageal reflux disease, esophagitis presence not specified  Hyperlipidemia, unspecified hyperlipidemia type  HTN (Hypertension)  H/O endoscopy  H/O colonoscopy  History of trigger finger: right middle finger - s/p surgical repair  H/O umbilical hernia repair  History of Appendectomy

## 2018-02-04 NOTE — PROGRESS NOTE ADULT - PROBLEM SELECTOR PROBLEM 2
Pain management
Gastroesophageal reflux disease, esophagitis presence not specified
Pain management

## 2018-02-05 ENCOUNTER — TRANSCRIPTION ENCOUNTER (OUTPATIENT)
Age: 53
End: 2018-02-05

## 2018-02-05 VITALS — WEIGHT: 225.53 LBS

## 2018-02-05 LAB
ALBUMIN SERPL ELPH-MCNC: 3.2 G/DL — LOW (ref 3.3–5)
ALP SERPL-CCNC: 48 U/L — SIGNIFICANT CHANGE UP (ref 40–120)
ALT FLD-CCNC: 27 U/L RC — SIGNIFICANT CHANGE UP (ref 10–45)
ANION GAP SERPL CALC-SCNC: 10 MMOL/L — SIGNIFICANT CHANGE UP (ref 5–17)
AST SERPL-CCNC: 18 U/L — SIGNIFICANT CHANGE UP (ref 10–40)
BILIRUB SERPL-MCNC: 0.5 MG/DL — SIGNIFICANT CHANGE UP (ref 0.2–1.2)
BUN SERPL-MCNC: 13 MG/DL — SIGNIFICANT CHANGE UP (ref 7–23)
CALCIUM SERPL-MCNC: 8.9 MG/DL — SIGNIFICANT CHANGE UP (ref 8.4–10.5)
CHLORIDE SERPL-SCNC: 100 MMOL/L — SIGNIFICANT CHANGE UP (ref 96–108)
CO2 SERPL-SCNC: 26 MMOL/L — SIGNIFICANT CHANGE UP (ref 22–31)
CREAT SERPL-MCNC: 1.15 MG/DL — SIGNIFICANT CHANGE UP (ref 0.5–1.3)
GLUCOSE BLDC GLUCOMTR-MCNC: 104 MG/DL — HIGH (ref 70–99)
GLUCOSE BLDC GLUCOMTR-MCNC: 98 MG/DL — SIGNIFICANT CHANGE UP (ref 70–99)
GLUCOSE SERPL-MCNC: 103 MG/DL — HIGH (ref 70–99)
HCT VFR BLD CALC: 33.3 % — LOW (ref 39–50)
HGB BLD-MCNC: 11.9 G/DL — LOW (ref 13–17)
MCHC RBC-ENTMCNC: 32.7 PG — SIGNIFICANT CHANGE UP (ref 27–34)
MCHC RBC-ENTMCNC: 35.7 GM/DL — SIGNIFICANT CHANGE UP (ref 32–36)
MCV RBC AUTO: 91.4 FL — SIGNIFICANT CHANGE UP (ref 80–100)
PLATELET # BLD AUTO: 181 K/UL — SIGNIFICANT CHANGE UP (ref 150–400)
POTASSIUM SERPL-MCNC: 4.6 MMOL/L — SIGNIFICANT CHANGE UP (ref 3.5–5.3)
POTASSIUM SERPL-SCNC: 4.6 MMOL/L — SIGNIFICANT CHANGE UP (ref 3.5–5.3)
PROT SERPL-MCNC: 6.2 G/DL — SIGNIFICANT CHANGE UP (ref 6–8.3)
RBC # BLD: 3.64 M/UL — LOW (ref 4.2–5.8)
RBC # FLD: 11.6 % — SIGNIFICANT CHANGE UP (ref 10.3–14.5)
SODIUM SERPL-SCNC: 136 MMOL/L — SIGNIFICANT CHANGE UP (ref 135–145)
WBC # BLD: 10.4 K/UL — SIGNIFICANT CHANGE UP (ref 3.8–10.5)
WBC # FLD AUTO: 10.4 K/UL — SIGNIFICANT CHANGE UP (ref 3.8–10.5)

## 2018-02-05 PROCEDURE — P9016: CPT

## 2018-02-05 PROCEDURE — 93005 ELECTROCARDIOGRAM TRACING: CPT

## 2018-02-05 PROCEDURE — P9047: CPT

## 2018-02-05 PROCEDURE — P9045: CPT

## 2018-02-05 PROCEDURE — 84484 ASSAY OF TROPONIN QUANT: CPT

## 2018-02-05 PROCEDURE — 85384 FIBRINOGEN ACTIVITY: CPT

## 2018-02-05 PROCEDURE — 85014 HEMATOCRIT: CPT

## 2018-02-05 PROCEDURE — 84132 ASSAY OF SERUM POTASSIUM: CPT

## 2018-02-05 PROCEDURE — 97116 GAIT TRAINING THERAPY: CPT

## 2018-02-05 PROCEDURE — 85027 COMPLETE CBC AUTOMATED: CPT

## 2018-02-05 PROCEDURE — C1769: CPT

## 2018-02-05 PROCEDURE — 82435 ASSAY OF BLOOD CHLORIDE: CPT

## 2018-02-05 PROCEDURE — C1729: CPT

## 2018-02-05 PROCEDURE — C1889: CPT

## 2018-02-05 PROCEDURE — 94002 VENT MGMT INPAT INIT DAY: CPT

## 2018-02-05 PROCEDURE — 86900 BLOOD TYPING SEROLOGIC ABO: CPT

## 2018-02-05 PROCEDURE — 84295 ASSAY OF SERUM SODIUM: CPT

## 2018-02-05 PROCEDURE — 82947 ASSAY GLUCOSE BLOOD QUANT: CPT

## 2018-02-05 PROCEDURE — 80053 COMPREHEN METABOLIC PANEL: CPT

## 2018-02-05 PROCEDURE — 86901 BLOOD TYPING SEROLOGIC RH(D): CPT

## 2018-02-05 PROCEDURE — 85610 PROTHROMBIN TIME: CPT

## 2018-02-05 PROCEDURE — 82550 ASSAY OF CK (CPK): CPT

## 2018-02-05 PROCEDURE — 83605 ASSAY OF LACTIC ACID: CPT

## 2018-02-05 PROCEDURE — 86923 COMPATIBILITY TEST ELECTRIC: CPT

## 2018-02-05 PROCEDURE — 82553 CREATINE MB FRACTION: CPT

## 2018-02-05 PROCEDURE — C1751: CPT

## 2018-02-05 PROCEDURE — 97110 THERAPEUTIC EXERCISES: CPT

## 2018-02-05 PROCEDURE — 82803 BLOOD GASES ANY COMBINATION: CPT

## 2018-02-05 PROCEDURE — 85730 THROMBOPLASTIN TIME PARTIAL: CPT

## 2018-02-05 PROCEDURE — 86891 AUTOLOGOUS BLOOD OP SALVAGE: CPT

## 2018-02-05 PROCEDURE — 99232 SBSQ HOSP IP/OBS MODERATE 35: CPT

## 2018-02-05 PROCEDURE — 80048 BASIC METABOLIC PNL TOTAL CA: CPT

## 2018-02-05 PROCEDURE — 71045 X-RAY EXAM CHEST 1 VIEW: CPT

## 2018-02-05 PROCEDURE — 82330 ASSAY OF CALCIUM: CPT

## 2018-02-05 PROCEDURE — 71045 X-RAY EXAM CHEST 1 VIEW: CPT | Mod: 26

## 2018-02-05 PROCEDURE — 82962 GLUCOSE BLOOD TEST: CPT

## 2018-02-05 PROCEDURE — 97161 PT EVAL LOW COMPLEX 20 MIN: CPT

## 2018-02-05 RX ORDER — OMEPRAZOLE 10 MG/1
1 CAPSULE, DELAYED RELEASE ORAL
Qty: 0 | Refills: 0 | COMMUNITY

## 2018-02-05 RX ORDER — OMEPRAZOLE 10 MG/1
1 CAPSULE, DELAYED RELEASE ORAL
Qty: 30 | Refills: 0 | OUTPATIENT
Start: 2018-02-05 | End: 2018-03-06

## 2018-02-05 RX ORDER — DOCUSATE SODIUM 100 MG
1 CAPSULE ORAL
Qty: 90 | Refills: 0 | OUTPATIENT
Start: 2018-02-05 | End: 2018-03-06

## 2018-02-05 RX ORDER — METOPROLOL TARTRATE 50 MG
1 TABLET ORAL
Qty: 0 | Refills: 0 | COMMUNITY

## 2018-02-05 RX ORDER — ACETAMINOPHEN 500 MG
3 TABLET ORAL
Qty: 90 | Refills: 0 | OUTPATIENT
Start: 2018-02-05 | End: 2018-02-14

## 2018-02-05 RX ORDER — METOPROLOL TARTRATE 50 MG
1 TABLET ORAL
Qty: 0 | Refills: 0 | COMMUNITY
Start: 2018-02-05

## 2018-02-05 RX ORDER — OXYCODONE HYDROCHLORIDE 5 MG/1
1 TABLET ORAL
Qty: 30 | Refills: 0 | OUTPATIENT
Start: 2018-02-05 | End: 2018-02-09

## 2018-02-05 RX ADMIN — PANTOPRAZOLE SODIUM 40 MILLIGRAM(S): 20 TABLET, DELAYED RELEASE ORAL at 05:57

## 2018-02-05 RX ADMIN — OXYCODONE HYDROCHLORIDE 10 MILLIGRAM(S): 5 TABLET ORAL at 10:00

## 2018-02-05 RX ADMIN — Medication 100 MILLIGRAM(S): at 05:57

## 2018-02-05 RX ADMIN — Medication 975 MILLIGRAM(S): at 05:56

## 2018-02-05 RX ADMIN — Medication 325 MILLIGRAM(S): at 11:32

## 2018-02-05 RX ADMIN — OXYCODONE HYDROCHLORIDE 10 MILLIGRAM(S): 5 TABLET ORAL at 03:25

## 2018-02-05 RX ADMIN — Medication 975 MILLIGRAM(S): at 06:26

## 2018-02-05 RX ADMIN — OXYCODONE HYDROCHLORIDE 10 MILLIGRAM(S): 5 TABLET ORAL at 03:55

## 2018-02-05 RX ADMIN — POLYETHYLENE GLYCOL 3350 17 GRAM(S): 17 POWDER, FOR SOLUTION ORAL at 11:32

## 2018-02-05 RX ADMIN — SODIUM CHLORIDE 3 MILLILITER(S): 9 INJECTION INTRAMUSCULAR; INTRAVENOUS; SUBCUTANEOUS at 05:57

## 2018-02-05 RX ADMIN — LIDOCAINE 1 PATCH: 4 CREAM TOPICAL at 11:33

## 2018-02-05 RX ADMIN — Medication 50 MILLIGRAM(S): at 05:57

## 2018-02-05 NOTE — DISCHARGE NOTE ADULT - CARE PROVIDERS DIRECT ADDRESSES
[email or web address]

## 2018-02-05 NOTE — PROGRESS NOTE ADULT - ASSESSMENT
52 year old with angina and severe CAD s/p C3L with LIMA to the LAD, and VGs to the OM and RCA.  Doing well postoperatively.   Continue asa/statin/beta-blocker.   Plan to see me this 2.20, or this Friday if he doesnt feel well.

## 2018-02-05 NOTE — DISCHARGE NOTE ADULT - PATIENT PORTAL LINK FT
You can access the [facility] Portal, offered by [facility], by registering with the following website: [email or web address]

## 2018-02-05 NOTE — DISCHARGE NOTE ADULT - HOSPITAL COURSE
51 y/o male with h/o HTN, HLD, LOAN not on CPAP and GERD with c/o mid-sternal pain x 3 days and belching 12/2017. Pt was seen in ED and underwent nuclear stress test, which revealed fixed inferior wall defect. Cardiac cath revealed left main and RCA coronary stenosis. IVUS confirmed stenosis of mid-distal left main.   Pt now s/p C3L on 1/31/2018.  Post-op Course:  2/1 transferred to floor, S. POD#1. Mediastinal CT dc'd. Ramírez dc'd-DTV.  2/2 Left neck (+) subcutaneous emphysema --> CXR New left neck subcutaneous emphysema, likely secondary to pneumomediastinum. Small left pleural effusion. No pneumothorax. The cardiomediastinal silhouette is not well evaluated on this projection. Status post median sternotomy. IMPRESSION:  Small left pleural effusion. New subcutaneous emphysema in the left neck, likely secondary to pneumomediastinum. Left Pleural CT --> LWS ; Leukocytosis --> likely reactive; continue to monitor   2/3 Left pleural CT clamped at 8 am --> Plan to repeat CXR at 12:00 pm; if no PTX likely will remove left CT as per Dr. Cantu / D/C PW   2/4 Supplement K  Disposition: Home PT 2/5

## 2018-02-05 NOTE — DISCHARGE NOTE ADULT - [id number]_GEN_ALL_CORE
medication teaching and assessment/observation and assessment/teaching and training/wound care and assessment

## 2018-02-05 NOTE — DISCHARGE NOTE ADULT - NS AS ACTIVITY OBS
Walking-Outdoors allowed/Stairs allowed/No Heavy lifting/straining/Showering allowed/Walking-Indoors allowed/Do not drive or operate machinery

## 2018-02-05 NOTE — DISCHARGE NOTE ADULT - CARE PROVIDER_API CALL
Jamie Cantu), Surgery; Thoracic and Cardiac Surgery  [address] [place] [address]  Phone: [phone]  Fax: [phone]

## 2018-02-05 NOTE — DISCHARGE NOTE ADULT - MEDICATION SUMMARY - MEDICATIONS TO TAKE
I will START or STAY ON the medications listed below when I get home from the hospital:    acetaminophen 325 mg oral tablet  -- 3 tab(s) by mouth every 8 hours, As Needed -for moderate pain   -- Indication: For moderate pain control    oxyCODONE 5 mg oral tablet  -- 1 tab(s) by mouth every 4 hours, As Needed -Moderate Pain (4 - 6) - for severe pain MDD:six tabs   -- Indication: For Severe pain control as needed    aspirin 81 mg oral tablet  -- 1 tab(s) by mouth once a day - last dose taken 1/23/18, per surgeon  -- Indication: For Antiplatelet    atorvastatin 80 mg oral tablet  -- 1 tab(s) by mouth once a day  -- Indication: For Antihyperlipidemic    Lopressor 50 mg oral tablet  -- 1 tab(s) by mouth 2 times a day  -- Indication: For Cardiovascular agent    docusate sodium 100 mg oral capsule  -- 1 cap(s) by mouth 3 times a day, As Needed -for constipation   -- Indication: For Stool softerner as needed    omeprazole 40 mg oral delayed release capsule  -- 1 cap(s) by mouth once a day  -- Indication: For dyspepsia

## 2018-02-05 NOTE — PROGRESS NOTE ADULT - SUBJECTIVE AND OBJECTIVE BOX
HPI: He feels fine. No chest pain.    The medical history is unchanged.  Review Of Systems:  No orthopnea or change in leg edema. The remainder of his ROS is unchanged.    Medications:  acetaminophen   Tablet. 975 milliGRAM(s) Oral every 8 hours  aspirin enteric coated 325 milliGRAM(s) Oral daily  atorvastatin 80 milliGRAM(s) Oral at bedtime  benzocaine 15 mG/menthol 3.6 mG Lozenge 1 Lozenge Oral every 4 hours PRN  docusate sodium 100 milliGRAM(s) Oral three times a day  enoxaparin Injectable 40 milliGRAM(s) SubCutaneous daily  insulin lispro (HumaLOG) corrective regimen sliding scale   SubCutaneous Before meals and at bedtime  lidocaine   Patch 1 Patch Transdermal daily  metoprolol     tartrate 50 milliGRAM(s) Oral two times a day  oxyCODONE    IR 10 milliGRAM(s) Oral every 4 hours PRN  oxyCODONE    IR 5 milliGRAM(s) Oral every 4 hours PRN  pantoprazole    Tablet 40 milliGRAM(s) Oral before breakfast  polyethylene glycol 3350 17 Gram(s) Oral daily  sodium chloride 0.9% lock flush 3 milliLiter(s) IV Push every 8 hours  sodium chloride 0.9%. 1000 milliLiter(s) IV Continuous <Continuous>    Allergies    No Known Allergies    Intolerances        Vitals:  T(F): 98.2 (18 @ 04:58), Max: 98.7 (18 @ 20:12)  HR: 89 (18 @ 04:58) (89 - 98)  BP: 111/62 (18 @ 04:58) (100/65 - 111/62)  RR: 18 (18 @ 04:58) (18 - 18)  SpO2: 93% (18 @ 04:58) (93% - 97%)  Daily     Daily Weight in k.3 (2018 07:48)  I&O's Summary    2018 07:01  -  2018 07:00  --------------------------------------------------------  IN: 1400 mL / OUT: 700 mL / NET: 700 mL        Physical Exam:  Appearance: NAD  HEENT: No icterus or JVD  Cardiovascular: Regular rhythm, normal S1, S2, No murmurs or rubs, No edema  Respiratory: clear to ascultation bilaterally, no crackles or wheeze. Well healed scar.  Gastrointestinal: +BS, soft  Musculoskeletal: No clubbing  Neurologic: Non-focal, alert and oriented, Mood & affect appropriate  Lymphatic: No lymphadenopathy  Skin: Warm and moist, no cyanosis                          11.9   10.4  )-----------( 181      ( 2018 06:11 )             33.3     02-05    136  |  100  |  13  ----------------------------<  103<H>  4.6   |  26  |  1.15    Ca    8.9      2018 06:11    TPro  6.2  /  Alb  3.2<L>  /  TBili  0.5  /  DBili  x   /  AST  18  /  ALT  27  /  AlkPhos  48  02-05                  Interpretation of Telemetry:

## 2018-02-05 NOTE — DISCHARGE NOTE ADULT - OTHER SIGNIFICANT FINDINGS
VITAL SIGNS    Telemetry:  SR 80-90  Vital Signs Last 24 Hrs  T(C): 36.8 (18 @ 04:58), Max: 37.1 (18 @ 20:12)  T(F): 98.2 (18 @ 04:58), Max: 98.7 (18 @ 20:12)  HR: 89 (18 @ 04:58) (89 - 98)  BP: 111/62 (18 @ 04:58) (100/65 - 111/62)  RR: 18 (18 @ 04:58) (18 - 18)  SpO2: 93% (18 @ 04:58) (93% - 97%)             @ 07:01  -   @ 07:00  --------------------------------------------------------  IN: 1400 mL / OUT: 700 mL / NET: 700 mL       Daily     Daily Weight in k.3 (2018 07:48)  Admit Wt: Drug Dosing Weight  Height (cm): 187.96 (2018 07:35)  Weight (kg): 102.7 (2018 07:35)  BMI (kg/m2): 29.1 (2018 07:35)  BSA (m2): 2.29 (2018 07:35)    Bilirubin Total, Serum: 0.5 mg/dL ( @ 06:11)    CAPILLARY BLOOD GLUCOSE      POCT Blood Glucose.: 98 mg/dL (2018 07:16)  POCT Blood Glucose.: 106 mg/dL (2018 21:53)  POCT Blood Glucose.: 104 mg/dL (2018 16:19)  POCT Blood Glucose.: 102 mg/dL (2018 11:09)          MEDICATIONS  acetaminophen   Tablet. 975 milliGRAM(s) Oral every 8 hours  aspirin enteric coated 325 milliGRAM(s) Oral daily  atorvastatin 80 milliGRAM(s) Oral at bedtime  benzocaine 15 mG/menthol 3.6 mG Lozenge 1 Lozenge Oral every 4 hours PRN  docusate sodium 100 milliGRAM(s) Oral three times a day  enoxaparin Injectable 40 milliGRAM(s) SubCutaneous daily  insulin lispro (HumaLOG) corrective regimen sliding scale   SubCutaneous Before meals and at bedtime  lidocaine   Patch 1 Patch Transdermal daily  metoprolol     tartrate 50 milliGRAM(s) Oral two times a day  oxyCODONE    IR 10 milliGRAM(s) Oral every 4 hours PRN  oxyCODONE    IR 5 milliGRAM(s) Oral every 4 hours PRN  pantoprazole    Tablet 40 milliGRAM(s) Oral before breakfast  polyethylene glycol 3350 17 Gram(s) Oral daily  sodium chloride 0.9% lock flush 3 milliLiter(s) IV Push every 8 hours  sodium chloride 0.9%. 1000 milliLiter(s) IV Continuous <Continuous>      PHYSICAL EXAM  Subjective: NAD  Neurology: alert and oriented x 3, nonfocal, no gross deficits  CV : S1S2  Sternal Wound :  CDI , Stable  Lungs: CTA b/l  Abdomen: soft, NT,ND, (+ )BM  :  voiding  Extremities:  -c/c/e     LABS      136  |  100  |  13  ----------------------------<  103<H>  4.6   |  26  |  1.15    Ca    8.9      2018 06:11    TPro  6.2  /  Alb  3.2<L>  /  TBili  0.5  /  DBili  x   /  AST  18  /  ALT  27  /  AlkPhos  48                                   11.9   10.4  )-----------( 181      ( 2018 06:11 )             33.3                 PAST MEDICAL & SURGICAL HISTORY:  PFO (patent foramen ovale): per CT Heart with coronaries 2017 - &quot;there is a patent foramen ovale with a small volume of left to right flow of contrast agent.&quot;  BPH (benign prostatic hyperplasia)  CAD (coronary artery disease)  LOAN (obstructive sleep apnea): noncompliant with dental device  Gastroesophageal reflux disease, esophagitis presence not specified  Hyperlipidemia, unspecified hyperlipidemia type  HTN (Hypertension)  H/O endoscopy  H/O colonoscopy  History of trigger finger: right middle finger - s/p surgical repair  H/O umbilical hernia repair  History of Appendectomy

## 2018-02-05 NOTE — DISCHARGE NOTE ADULT - PLAN OF CARE
Recovery Resume activity as tolerated   Shower daily and wash all incisions with soap and water  Ambulate  at least four times daily  Record daily weigh and report changes in weight greater than 3 lbs  Resume low cholesterol low sodium diet  Use incentive spirometry every hour during the day  Please follow up with Dr Cantu 2/26/17 @1:00pm Call our office if you need to be seen sooner.  Please follow up with your cardiologist; Dr Lisker in 2 weeks

## 2018-02-14 ENCOUNTER — RECORD ABSTRACTING (OUTPATIENT)
Age: 53
End: 2018-02-14

## 2018-02-14 RX ORDER — METOPROLOL SUCCINATE 25 MG/1
25 TABLET, EXTENDED RELEASE ORAL DAILY
Qty: 30 | Refills: 1 | Status: DISCONTINUED | COMMUNITY
Start: 2017-12-26 | End: 2018-02-14

## 2018-02-21 ENCOUNTER — NON-APPOINTMENT (OUTPATIENT)
Age: 53
End: 2018-02-21

## 2018-02-21 ENCOUNTER — APPOINTMENT (OUTPATIENT)
Dept: CARDIOLOGY | Facility: CLINIC | Age: 53
End: 2018-02-21
Payer: COMMERCIAL

## 2018-02-21 VITALS
BODY MASS INDEX: 27.24 KG/M2 | OXYGEN SATURATION: 96 % | DIASTOLIC BLOOD PRESSURE: 70 MMHG | HEART RATE: 82 BPM | WEIGHT: 215 LBS | SYSTOLIC BLOOD PRESSURE: 96 MMHG

## 2018-02-21 PROCEDURE — 93000 ELECTROCARDIOGRAM COMPLETE: CPT

## 2018-02-21 PROCEDURE — 99215 OFFICE O/P EST HI 40 MIN: CPT

## 2018-02-21 RX ORDER — METOPROLOL TARTRATE 50 MG/1
50 TABLET ORAL DAILY
Refills: 0 | Status: DISCONTINUED | COMMUNITY
End: 2018-02-21

## 2018-02-26 ENCOUNTER — APPOINTMENT (OUTPATIENT)
Dept: CARDIOTHORACIC SURGERY | Facility: CLINIC | Age: 53
End: 2018-02-26
Payer: COMMERCIAL

## 2018-02-26 ENCOUNTER — OTHER (OUTPATIENT)
Age: 53
End: 2018-02-26

## 2018-02-26 VITALS
BODY MASS INDEX: 26.79 KG/M2 | OXYGEN SATURATION: 97 % | TEMPERATURE: 97.6 F | RESPIRATION RATE: 15 BRPM | DIASTOLIC BLOOD PRESSURE: 81 MMHG | HEIGHT: 74.5 IN | HEART RATE: 100 BPM | SYSTOLIC BLOOD PRESSURE: 125 MMHG | WEIGHT: 211 LBS

## 2018-02-26 DIAGNOSIS — Z09 ENCOUNTER FOR FOLLOW-UP EXAMINATION AFTER COMPLETED TREATMENT FOR CONDITIONS OTHER THAN MALIGNANT NEOPLASM: ICD-10-CM

## 2018-02-26 PROCEDURE — 99024 POSTOP FOLLOW-UP VISIT: CPT

## 2018-02-26 RX ORDER — DOCUSATE SODIUM 100 MG/1
100 CAPSULE, LIQUID FILLED ORAL 3 TIMES DAILY
Refills: 0 | Status: DISCONTINUED | COMMUNITY
End: 2018-02-26

## 2018-02-26 RX ORDER — OXYCODONE 5 MG/1
5 TABLET ORAL
Refills: 0 | Status: DISCONTINUED | COMMUNITY
End: 2018-02-26

## 2018-02-28 ENCOUNTER — APPOINTMENT (OUTPATIENT)
Dept: CARDIOLOGY | Facility: CLINIC | Age: 53
End: 2018-02-28
Payer: COMMERCIAL

## 2018-02-28 PROCEDURE — 93015 CV STRESS TEST SUPVJ I&R: CPT

## 2018-03-06 ENCOUNTER — APPOINTMENT (OUTPATIENT)
Dept: CARDIOLOGY | Facility: CLINIC | Age: 53
End: 2018-03-06
Payer: COMMERCIAL

## 2018-03-06 PROCEDURE — 93798 PHYS/QHP OP CAR RHAB W/ECG: CPT

## 2018-03-08 ENCOUNTER — APPOINTMENT (OUTPATIENT)
Dept: CARDIOLOGY | Facility: CLINIC | Age: 53
End: 2018-03-08
Payer: COMMERCIAL

## 2018-03-08 PROCEDURE — 93798 PHYS/QHP OP CAR RHAB W/ECG: CPT

## 2018-03-12 ENCOUNTER — APPOINTMENT (OUTPATIENT)
Dept: CARDIOLOGY | Facility: CLINIC | Age: 53
End: 2018-03-12
Payer: COMMERCIAL

## 2018-03-12 PROCEDURE — 93798 PHYS/QHP OP CAR RHAB W/ECG: CPT

## 2018-03-14 ENCOUNTER — APPOINTMENT (OUTPATIENT)
Dept: CARDIOLOGY | Facility: CLINIC | Age: 53
End: 2018-03-14
Payer: COMMERCIAL

## 2018-03-14 ENCOUNTER — NON-APPOINTMENT (OUTPATIENT)
Age: 53
End: 2018-03-14

## 2018-03-14 VITALS
WEIGHT: 210 LBS | OXYGEN SATURATION: 96 % | BODY MASS INDEX: 26.66 KG/M2 | DIASTOLIC BLOOD PRESSURE: 70 MMHG | HEART RATE: 81 BPM | TEMPERATURE: 98 F | SYSTOLIC BLOOD PRESSURE: 90 MMHG | HEIGHT: 74.5 IN

## 2018-03-14 DIAGNOSIS — F17.200 NICOTINE DEPENDENCE, UNSPECIFIED, UNCOMPLICATED: ICD-10-CM

## 2018-03-14 PROCEDURE — 36415 COLL VENOUS BLD VENIPUNCTURE: CPT

## 2018-03-14 PROCEDURE — 99214 OFFICE O/P EST MOD 30 MIN: CPT

## 2018-03-14 PROCEDURE — 93798 PHYS/QHP OP CAR RHAB W/ECG: CPT

## 2018-03-14 PROCEDURE — 93000 ELECTROCARDIOGRAM COMPLETE: CPT

## 2018-03-15 ENCOUNTER — APPOINTMENT (OUTPATIENT)
Dept: CARDIOLOGY | Facility: CLINIC | Age: 53
End: 2018-03-15
Payer: COMMERCIAL

## 2018-03-15 PROCEDURE — 93798 PHYS/QHP OP CAR RHAB W/ECG: CPT

## 2018-03-16 LAB
ALBUMIN SERPL ELPH-MCNC: 4.5 G/DL
ALP BLD-CCNC: 98 U/L
ALT SERPL-CCNC: 26 U/L
ANION GAP SERPL CALC-SCNC: 13 MMOL/L
AST SERPL-CCNC: 15 U/L
BASOPHILS # BLD AUTO: 0.07 K/UL
BASOPHILS NFR BLD AUTO: 0.6 %
BILIRUB SERPL-MCNC: 0.3 MG/DL
BUN SERPL-MCNC: 15 MG/DL
CALCIUM SERPL-MCNC: 10.1 MG/DL
CHLORIDE SERPL-SCNC: 102 MMOL/L
CHOLEST SERPL-MCNC: 131 MG/DL
CHOLEST/HDLC SERPL: 3.6 RATIO
CO2 SERPL-SCNC: 26 MMOL/L
CREAT SERPL-MCNC: 1.11 MG/DL
EOSINOPHIL # BLD AUTO: 0.27 K/UL
EOSINOPHIL NFR BLD AUTO: 2.4 %
GLUCOSE SERPL-MCNC: 84 MG/DL
HCT VFR BLD CALC: 43.6 %
HDLC SERPL-MCNC: 36 MG/DL
HGB BLD-MCNC: 14 G/DL
IMM GRANULOCYTES NFR BLD AUTO: 0.3 %
LDLC SERPL CALC-MCNC: 60 MG/DL
LYMPHOCYTES # BLD AUTO: 2.32 K/UL
LYMPHOCYTES NFR BLD AUTO: 21 %
MAN DIFF?: NORMAL
MCHC RBC-ENTMCNC: 28.9 PG
MCHC RBC-ENTMCNC: 32.1 GM/DL
MCV RBC AUTO: 90.1 FL
MONOCYTES # BLD AUTO: 0.85 K/UL
MONOCYTES NFR BLD AUTO: 7.7 %
NEUTROPHILS # BLD AUTO: 7.5 K/UL
NEUTROPHILS NFR BLD AUTO: 68 %
PLATELET # BLD AUTO: 265 K/UL
POTASSIUM SERPL-SCNC: 4.8 MMOL/L
PROT SERPL-MCNC: 7.6 G/DL
RBC # BLD: 4.84 M/UL
RBC # FLD: 13.5 %
SODIUM SERPL-SCNC: 141 MMOL/L
TRIGL SERPL-MCNC: 176 MG/DL
WBC # FLD AUTO: 11.04 K/UL

## 2018-03-19 ENCOUNTER — MEDICATION RENEWAL (OUTPATIENT)
Age: 53
End: 2018-03-19

## 2018-03-19 ENCOUNTER — APPOINTMENT (OUTPATIENT)
Dept: CARDIOLOGY | Facility: CLINIC | Age: 53
End: 2018-03-19
Payer: COMMERCIAL

## 2018-03-19 PROCEDURE — 93798 PHYS/QHP OP CAR RHAB W/ECG: CPT

## 2018-03-20 ENCOUNTER — RX RENEWAL (OUTPATIENT)
Age: 53
End: 2018-03-20

## 2018-03-22 ENCOUNTER — APPOINTMENT (OUTPATIENT)
Dept: CARDIOLOGY | Facility: CLINIC | Age: 53
End: 2018-03-22
Payer: COMMERCIAL

## 2018-03-22 PROCEDURE — 93798 PHYS/QHP OP CAR RHAB W/ECG: CPT

## 2018-03-26 ENCOUNTER — APPOINTMENT (OUTPATIENT)
Dept: CARDIOLOGY | Facility: CLINIC | Age: 53
End: 2018-03-26
Payer: COMMERCIAL

## 2018-03-26 PROCEDURE — 93798 PHYS/QHP OP CAR RHAB W/ECG: CPT

## 2018-03-28 ENCOUNTER — APPOINTMENT (OUTPATIENT)
Dept: CARDIOLOGY | Facility: CLINIC | Age: 53
End: 2018-03-28
Payer: COMMERCIAL

## 2018-03-28 ENCOUNTER — MEDICATION RENEWAL (OUTPATIENT)
Age: 53
End: 2018-03-28

## 2018-03-28 PROCEDURE — 93798 PHYS/QHP OP CAR RHAB W/ECG: CPT

## 2018-03-29 ENCOUNTER — APPOINTMENT (OUTPATIENT)
Dept: CARDIOLOGY | Facility: CLINIC | Age: 53
End: 2018-03-29
Payer: COMMERCIAL

## 2018-03-29 PROCEDURE — 93798 PHYS/QHP OP CAR RHAB W/ECG: CPT

## 2018-04-02 ENCOUNTER — APPOINTMENT (OUTPATIENT)
Dept: CARDIOLOGY | Facility: CLINIC | Age: 53
End: 2018-04-02
Payer: COMMERCIAL

## 2018-04-02 PROCEDURE — 93798 PHYS/QHP OP CAR RHAB W/ECG: CPT

## 2018-04-04 ENCOUNTER — APPOINTMENT (OUTPATIENT)
Dept: CARDIOLOGY | Facility: CLINIC | Age: 53
End: 2018-04-04
Payer: COMMERCIAL

## 2018-04-04 PROCEDURE — 93798 PHYS/QHP OP CAR RHAB W/ECG: CPT

## 2018-04-05 ENCOUNTER — APPOINTMENT (OUTPATIENT)
Dept: CARDIOLOGY | Facility: CLINIC | Age: 53
End: 2018-04-05
Payer: COMMERCIAL

## 2018-04-05 PROCEDURE — 93798 PHYS/QHP OP CAR RHAB W/ECG: CPT

## 2018-04-09 ENCOUNTER — APPOINTMENT (OUTPATIENT)
Dept: CARDIOLOGY | Facility: CLINIC | Age: 53
End: 2018-04-09
Payer: COMMERCIAL

## 2018-04-09 PROCEDURE — 93798 PHYS/QHP OP CAR RHAB W/ECG: CPT

## 2018-04-09 NOTE — H&P PST ADULT - LIVES WITH, PROFILE
Several attempts have been made to contact patient for follow-up.  Case closed.  OPCM RN has been notified.   alone

## 2018-04-11 ENCOUNTER — APPOINTMENT (OUTPATIENT)
Dept: CARDIOLOGY | Facility: CLINIC | Age: 53
End: 2018-04-11
Payer: COMMERCIAL

## 2018-04-11 PROCEDURE — 93798 PHYS/QHP OP CAR RHAB W/ECG: CPT

## 2018-04-12 ENCOUNTER — APPOINTMENT (OUTPATIENT)
Dept: CARDIOLOGY | Facility: CLINIC | Age: 53
End: 2018-04-12
Payer: COMMERCIAL

## 2018-04-12 PROCEDURE — 93798 PHYS/QHP OP CAR RHAB W/ECG: CPT

## 2018-04-16 ENCOUNTER — APPOINTMENT (OUTPATIENT)
Dept: CARDIOLOGY | Facility: CLINIC | Age: 53
End: 2018-04-16
Payer: COMMERCIAL

## 2018-04-16 PROCEDURE — 93798 PHYS/QHP OP CAR RHAB W/ECG: CPT

## 2018-04-18 ENCOUNTER — APPOINTMENT (OUTPATIENT)
Dept: CARDIOLOGY | Facility: CLINIC | Age: 53
End: 2018-04-18
Payer: COMMERCIAL

## 2018-04-18 PROCEDURE — 93798 PHYS/QHP OP CAR RHAB W/ECG: CPT

## 2018-04-19 ENCOUNTER — APPOINTMENT (OUTPATIENT)
Dept: CARDIOLOGY | Facility: CLINIC | Age: 53
End: 2018-04-19
Payer: COMMERCIAL

## 2018-04-19 PROCEDURE — 93798 PHYS/QHP OP CAR RHAB W/ECG: CPT

## 2018-04-23 ENCOUNTER — APPOINTMENT (OUTPATIENT)
Dept: CARDIOLOGY | Facility: CLINIC | Age: 53
End: 2018-04-23
Payer: COMMERCIAL

## 2018-04-23 PROCEDURE — 93798 PHYS/QHP OP CAR RHAB W/ECG: CPT

## 2018-04-25 ENCOUNTER — APPOINTMENT (OUTPATIENT)
Dept: CARDIOLOGY | Facility: CLINIC | Age: 53
End: 2018-04-25
Payer: COMMERCIAL

## 2018-04-25 VITALS
WEIGHT: 216 LBS | HEART RATE: 59 BPM | OXYGEN SATURATION: 100 % | BODY MASS INDEX: 27.36 KG/M2 | SYSTOLIC BLOOD PRESSURE: 98 MMHG | DIASTOLIC BLOOD PRESSURE: 70 MMHG

## 2018-04-25 PROCEDURE — 93000 ELECTROCARDIOGRAM COMPLETE: CPT

## 2018-04-25 PROCEDURE — 93798 PHYS/QHP OP CAR RHAB W/ECG: CPT | Mod: 25

## 2018-04-25 PROCEDURE — 93798 PHYS/QHP OP CAR RHAB W/ECG: CPT

## 2018-04-25 PROCEDURE — 99215 OFFICE O/P EST HI 40 MIN: CPT

## 2018-04-26 ENCOUNTER — APPOINTMENT (OUTPATIENT)
Dept: CARDIOLOGY | Facility: CLINIC | Age: 53
End: 2018-04-26
Payer: COMMERCIAL

## 2018-04-26 PROCEDURE — 93798 PHYS/QHP OP CAR RHAB W/ECG: CPT

## 2018-04-30 ENCOUNTER — APPOINTMENT (OUTPATIENT)
Dept: CARDIOLOGY | Facility: CLINIC | Age: 53
End: 2018-04-30
Payer: COMMERCIAL

## 2018-04-30 PROCEDURE — 93798 PHYS/QHP OP CAR RHAB W/ECG: CPT

## 2018-05-02 ENCOUNTER — APPOINTMENT (OUTPATIENT)
Dept: CARDIOLOGY | Facility: CLINIC | Age: 53
End: 2018-05-02
Payer: COMMERCIAL

## 2018-05-02 PROCEDURE — 93798 PHYS/QHP OP CAR RHAB W/ECG: CPT

## 2018-05-02 PROCEDURE — 93306 TTE W/DOPPLER COMPLETE: CPT

## 2018-05-03 ENCOUNTER — APPOINTMENT (OUTPATIENT)
Dept: CARDIOLOGY | Facility: CLINIC | Age: 53
End: 2018-05-03
Payer: COMMERCIAL

## 2018-05-03 PROCEDURE — 93798 PHYS/QHP OP CAR RHAB W/ECG: CPT

## 2018-05-07 ENCOUNTER — APPOINTMENT (OUTPATIENT)
Dept: CARDIOLOGY | Facility: CLINIC | Age: 53
End: 2018-05-07
Payer: COMMERCIAL

## 2018-05-07 PROCEDURE — 93798 PHYS/QHP OP CAR RHAB W/ECG: CPT

## 2018-05-09 ENCOUNTER — APPOINTMENT (OUTPATIENT)
Dept: CARDIOLOGY | Facility: CLINIC | Age: 53
End: 2018-05-09
Payer: COMMERCIAL

## 2018-05-09 PROCEDURE — 93798 PHYS/QHP OP CAR RHAB W/ECG: CPT

## 2018-05-10 ENCOUNTER — APPOINTMENT (OUTPATIENT)
Dept: CARDIOLOGY | Facility: CLINIC | Age: 53
End: 2018-05-10
Payer: COMMERCIAL

## 2018-05-10 PROCEDURE — 93798 PHYS/QHP OP CAR RHAB W/ECG: CPT

## 2018-05-14 ENCOUNTER — APPOINTMENT (OUTPATIENT)
Dept: CARDIOLOGY | Facility: CLINIC | Age: 53
End: 2018-05-14
Payer: COMMERCIAL

## 2018-05-14 PROCEDURE — 93798 PHYS/QHP OP CAR RHAB W/ECG: CPT

## 2018-05-16 ENCOUNTER — NON-APPOINTMENT (OUTPATIENT)
Age: 53
End: 2018-05-16

## 2018-05-16 ENCOUNTER — APPOINTMENT (OUTPATIENT)
Dept: CARDIOLOGY | Facility: CLINIC | Age: 53
End: 2018-05-16
Payer: COMMERCIAL

## 2018-05-16 VITALS
BODY MASS INDEX: 27.36 KG/M2 | WEIGHT: 216 LBS | SYSTOLIC BLOOD PRESSURE: 118 MMHG | OXYGEN SATURATION: 98 % | HEART RATE: 77 BPM | DIASTOLIC BLOOD PRESSURE: 80 MMHG | TEMPERATURE: 97.9 F

## 2018-05-16 PROCEDURE — 93798 PHYS/QHP OP CAR RHAB W/ECG: CPT

## 2018-05-16 PROCEDURE — 99214 OFFICE O/P EST MOD 30 MIN: CPT | Mod: 25

## 2018-05-16 PROCEDURE — 93000 ELECTROCARDIOGRAM COMPLETE: CPT

## 2018-05-17 ENCOUNTER — APPOINTMENT (OUTPATIENT)
Dept: CARDIOLOGY | Facility: CLINIC | Age: 53
End: 2018-05-17
Payer: COMMERCIAL

## 2018-05-17 PROCEDURE — 93798 PHYS/QHP OP CAR RHAB W/ECG: CPT

## 2018-05-21 ENCOUNTER — APPOINTMENT (OUTPATIENT)
Dept: CARDIOLOGY | Facility: CLINIC | Age: 53
End: 2018-05-21
Payer: COMMERCIAL

## 2018-05-21 PROCEDURE — 93798 PHYS/QHP OP CAR RHAB W/ECG: CPT

## 2018-05-23 ENCOUNTER — APPOINTMENT (OUTPATIENT)
Dept: CARDIOLOGY | Facility: CLINIC | Age: 53
End: 2018-05-23
Payer: COMMERCIAL

## 2018-05-23 PROCEDURE — 93798 PHYS/QHP OP CAR RHAB W/ECG: CPT

## 2018-05-24 ENCOUNTER — APPOINTMENT (OUTPATIENT)
Dept: CARDIOLOGY | Facility: CLINIC | Age: 53
End: 2018-05-24
Payer: COMMERCIAL

## 2018-05-24 PROCEDURE — 93798 PHYS/QHP OP CAR RHAB W/ECG: CPT

## 2018-05-30 ENCOUNTER — RX RENEWAL (OUTPATIENT)
Age: 53
End: 2018-05-30

## 2018-06-04 ENCOUNTER — APPOINTMENT (OUTPATIENT)
Dept: CARDIOLOGY | Facility: CLINIC | Age: 53
End: 2018-06-04
Payer: COMMERCIAL

## 2018-06-04 PROCEDURE — 93798 PHYS/QHP OP CAR RHAB W/ECG: CPT

## 2018-06-06 ENCOUNTER — APPOINTMENT (OUTPATIENT)
Dept: CARDIOLOGY | Facility: CLINIC | Age: 53
End: 2018-06-06
Payer: COMMERCIAL

## 2018-06-06 PROCEDURE — 93798 PHYS/QHP OP CAR RHAB W/ECG: CPT

## 2018-07-16 PROBLEM — G47.33 OBSTRUCTIVE SLEEP APNEA (ADULT) (PEDIATRIC): Chronic | Status: ACTIVE | Noted: 2017-12-28

## 2018-07-22 PROBLEM — Z80.0 FAMILY HISTORY OF COLON CANCER: Status: ACTIVE | Noted: 2017-12-12

## 2018-07-25 ENCOUNTER — APPOINTMENT (OUTPATIENT)
Dept: CARDIOLOGY | Facility: CLINIC | Age: 53
End: 2018-07-25
Payer: COMMERCIAL

## 2018-07-25 ENCOUNTER — NON-APPOINTMENT (OUTPATIENT)
Age: 53
End: 2018-07-25

## 2018-07-25 VITALS
HEART RATE: 69 BPM | OXYGEN SATURATION: 96 % | BODY MASS INDEX: 27.17 KG/M2 | HEIGHT: 74.5 IN | WEIGHT: 214 LBS | DIASTOLIC BLOOD PRESSURE: 84 MMHG | SYSTOLIC BLOOD PRESSURE: 108 MMHG | TEMPERATURE: 97.8 F

## 2018-07-25 PROBLEM — N40.0 BENIGN PROSTATIC HYPERPLASIA WITHOUT LOWER URINARY TRACT SYMPTOMS: Chronic | Status: ACTIVE | Noted: 2018-01-24

## 2018-07-25 PROBLEM — E78.5 HYPERLIPIDEMIA, UNSPECIFIED: Chronic | Status: ACTIVE | Noted: 2017-12-28

## 2018-07-25 PROBLEM — K21.9 GASTRO-ESOPHAGEAL REFLUX DISEASE WITHOUT ESOPHAGITIS: Chronic | Status: ACTIVE | Noted: 2017-12-28

## 2018-07-25 PROBLEM — I25.10 ATHEROSCLEROTIC HEART DISEASE OF NATIVE CORONARY ARTERY WITHOUT ANGINA PECTORIS: Chronic | Status: ACTIVE | Noted: 2018-01-24

## 2018-07-25 PROBLEM — Q21.1 ATRIAL SEPTAL DEFECT: Chronic | Status: ACTIVE | Noted: 2018-01-24

## 2018-07-25 PROCEDURE — 99214 OFFICE O/P EST MOD 30 MIN: CPT

## 2018-07-25 PROCEDURE — 93000 ELECTROCARDIOGRAM COMPLETE: CPT

## 2018-07-25 RX ORDER — ATORVASTATIN CALCIUM 80 MG/1
80 TABLET, FILM COATED ORAL DAILY
Qty: 60 | Refills: 0 | Status: DISCONTINUED | COMMUNITY
Start: 2017-12-12 | End: 2018-07-25

## 2018-07-26 LAB
25(OH)D3 SERPL-MCNC: 60.2 NG/ML
ALBUMIN SERPL ELPH-MCNC: 4.8 G/DL
ALP BLD-CCNC: 89 U/L
ALT SERPL-CCNC: 29 U/L
ANION GAP SERPL CALC-SCNC: 13 MMOL/L
AST SERPL-CCNC: 21 U/L
BASOPHILS # BLD AUTO: 0.06 K/UL
BASOPHILS NFR BLD AUTO: 0.9 %
BILIRUB SERPL-MCNC: 0.7 MG/DL
BUN SERPL-MCNC: 14 MG/DL
CALCIUM SERPL-MCNC: 10.1 MG/DL
CHLORIDE SERPL-SCNC: 101 MMOL/L
CHOLEST SERPL-MCNC: 119 MG/DL
CHOLEST/HDLC SERPL: 3.1 RATIO
CO2 SERPL-SCNC: 25 MMOL/L
CREAT SERPL-MCNC: 1.12 MG/DL
EOSINOPHIL # BLD AUTO: 0.15 K/UL
EOSINOPHIL NFR BLD AUTO: 2.1 %
GLUCOSE SERPL-MCNC: 98 MG/DL
HCT VFR BLD CALC: 46.5 %
HDLC SERPL-MCNC: 39 MG/DL
HGB BLD-MCNC: 15.2 G/DL
IMM GRANULOCYTES NFR BLD AUTO: 0.1 %
LDLC SERPL CALC-MCNC: 59 MG/DL
LYMPHOCYTES # BLD AUTO: 2.57 K/UL
LYMPHOCYTES NFR BLD AUTO: 36.5 %
MAN DIFF?: NORMAL
MCHC RBC-ENTMCNC: 29.5 PG
MCHC RBC-ENTMCNC: 32.7 GM/DL
MCV RBC AUTO: 90.3 FL
MONOCYTES # BLD AUTO: 0.66 K/UL
MONOCYTES NFR BLD AUTO: 9.4 %
NEUTROPHILS # BLD AUTO: 3.6 K/UL
NEUTROPHILS NFR BLD AUTO: 51 %
PLATELET # BLD AUTO: 222 K/UL
POTASSIUM SERPL-SCNC: 5.2 MMOL/L
PROT SERPL-MCNC: 7.4 G/DL
RBC # BLD: 5.15 M/UL
RBC # FLD: 13.2 %
SODIUM SERPL-SCNC: 139 MMOL/L
TRIGL SERPL-MCNC: 105 MG/DL
WBC # FLD AUTO: 7.05 K/UL

## 2018-10-22 ENCOUNTER — RX RENEWAL (OUTPATIENT)
Age: 53
End: 2018-10-22

## 2018-10-29 ENCOUNTER — MEDICATION RENEWAL (OUTPATIENT)
Age: 53
End: 2018-10-29

## 2018-11-01 ENCOUNTER — APPOINTMENT (OUTPATIENT)
Dept: CARDIOLOGY | Facility: CLINIC | Age: 53
End: 2018-11-01
Payer: COMMERCIAL

## 2018-11-01 ENCOUNTER — NON-APPOINTMENT (OUTPATIENT)
Age: 53
End: 2018-11-01

## 2018-11-01 VITALS
OXYGEN SATURATION: 97 % | BODY MASS INDEX: 25.97 KG/M2 | SYSTOLIC BLOOD PRESSURE: 118 MMHG | WEIGHT: 205 LBS | HEART RATE: 61 BPM | DIASTOLIC BLOOD PRESSURE: 80 MMHG

## 2018-11-01 PROCEDURE — 93000 ELECTROCARDIOGRAM COMPLETE: CPT

## 2018-11-01 PROCEDURE — 99214 OFFICE O/P EST MOD 30 MIN: CPT

## 2018-11-02 LAB
ALBUMIN SERPL ELPH-MCNC: 4.1 G/DL
ALP BLD-CCNC: 69 U/L
ALT SERPL-CCNC: 22 U/L
ANION GAP SERPL CALC-SCNC: 13 MMOL/L
AST SERPL-CCNC: 19 U/L
BILIRUB SERPL-MCNC: 0.3 MG/DL
BUN SERPL-MCNC: 13 MG/DL
CALCIUM SERPL-MCNC: 9.6 MG/DL
CHLORIDE SERPL-SCNC: 106 MMOL/L
CHOLEST SERPL-MCNC: 113 MG/DL
CHOLEST/HDLC SERPL: 2.7 RATIO
CO2 SERPL-SCNC: 26 MMOL/L
CREAT SERPL-MCNC: 1.23 MG/DL
GLUCOSE SERPL-MCNC: 95 MG/DL
HDLC SERPL-MCNC: 42 MG/DL
LDLC SERPL CALC-MCNC: 51 MG/DL
POTASSIUM SERPL-SCNC: 4.8 MMOL/L
PROT SERPL-MCNC: 6.9 G/DL
SODIUM SERPL-SCNC: 145 MMOL/L
TRIGL SERPL-MCNC: 99 MG/DL

## 2018-11-12 NOTE — ED CDU PROVIDER INITIAL DAY NOTE - NEUROLOGICAL, MLM
[FreeTextEntry1] : It was my pleasure to see SILAS in cardiac consultation. I am pleased with SILAS 's evaluation today and continuation of routine pediatric care is recommended. SILAS s CV examination, EKG and echocardiogram were normal and reassuring.  SILAS 's murmur is functional in origin. I discussed with the family that the murmur is not related to cardiac pathology, and may get louder during times of illness or fever.  I explained the benign course of this transitional circulation/ murmur which is expected to resolve with time. I answered parent's questions and he/she expressed understanding. If you still have concerns in the future, please do not hesitate to contact me or refer the patient for reevaluation. There is no need for restriction of activities or SBE prophylaxis.\par \par In case it is necessary:\par SILAS is cleared for any upcoming procedure / surgery / anesthesia from the CV point until his next visit, unless  new CV symptoms arise. He does not require SBE prophylaxis unless listed in the electronic record. Oxygen saturations are expected to be normal.\par  [Needs SBE Prophylaxis] : [unfilled] does not need bacterial endocarditis prophylaxis [PE + No Restrictions] : [unfilled] may participate in the entire physical education program without restriction, including all varsity competitive sports. Alert and oriented, no focal deficits, no motor or sensory deficits.

## 2019-01-28 ENCOUNTER — RX RENEWAL (OUTPATIENT)
Age: 54
End: 2019-01-28

## 2019-02-05 ENCOUNTER — APPOINTMENT (OUTPATIENT)
Dept: CARDIOLOGY | Facility: CLINIC | Age: 54
End: 2019-02-05

## 2019-02-21 ENCOUNTER — NON-APPOINTMENT (OUTPATIENT)
Age: 54
End: 2019-02-21

## 2019-02-21 ENCOUNTER — APPOINTMENT (OUTPATIENT)
Dept: CARDIOLOGY | Facility: CLINIC | Age: 54
End: 2019-02-21
Payer: COMMERCIAL

## 2019-02-21 VITALS
DIASTOLIC BLOOD PRESSURE: 80 MMHG | HEART RATE: 64 BPM | BODY MASS INDEX: 25.72 KG/M2 | WEIGHT: 203 LBS | SYSTOLIC BLOOD PRESSURE: 118 MMHG | OXYGEN SATURATION: 97 %

## 2019-02-21 PROCEDURE — 99214 OFFICE O/P EST MOD 30 MIN: CPT

## 2019-02-21 PROCEDURE — 93000 ELECTROCARDIOGRAM COMPLETE: CPT

## 2019-02-21 NOTE — PHYSICAL EXAM
[General Appearance - Well Developed] : well developed [General Appearance - Well Nourished] : well nourished [General Appearance - In No Acute Distress] : no acute distress [Normal Conjunctiva] : the conjunctiva exhibited no abnormalities [No Oral Pallor] : no oral pallor [Normal Jugular Venous V Waves Present] : normal jugular venous V waves present [Respiration, Rhythm And Depth] : normal respiratory rhythm and effort [Auscultation Breath Sounds / Voice Sounds] : lungs were clear to auscultation bilaterally [Heart Sounds] : normal S1 and S2 [Arterial Pulses Normal] : the arterial pulses were normal [Edema] : no peripheral edema present [Regular] : the rhythm was regular [FreeTextEntry1] : Well healed midline scar. [Bowel Sounds] : normal bowel sounds [Abdomen Soft] : soft [Abnormal Walk] : normal gait [Nail Clubbing] : no clubbing of the fingernails [Skin Turgor] : normal skin turgor [Oriented To Time, Place, And Person] : oriented to person, place, and time [Impaired Insight] : insight and judgment were intact [Affect] : the affect was normal

## 2019-02-21 NOTE — HISTORY OF PRESENT ILLNESS
[FreeTextEntry1] : Occasional sternal ache/tenderness with extreme exercise.\par No angina.\par He continues to exercise routinely.\par \par s/p CABG x 3 with LIMA on 1/31/2018. Postoperative pneumomediastinum.

## 2019-02-21 NOTE — DISCUSSION/SUMMARY
[FreeTextEntry1] : He is a 54-year-old with CAD s/p 3 vessel CABG, doing well postoperatively. Normal EF.\par Continued weight loss.\par Will continue toprol xl  25 mg qd. \par Continue asa/statin.\par Encouraged continued aerobic exercise.\par LDL at goal.\par followup in 3 months. Labs before next visit.\par

## 2019-02-21 NOTE — REVIEW OF SYSTEMS
[see HPI] : see HPI [Joint Pain] : joint pain [Muscle Cramps] : muscle cramps [Negative] : Heme/Lymph

## 2019-05-28 LAB
ALBUMIN SERPL ELPH-MCNC: 4.6 G/DL
ALP BLD-CCNC: 68 U/L
ALT SERPL-CCNC: 26 U/L
ANION GAP SERPL CALC-SCNC: 15 MMOL/L
AST SERPL-CCNC: 17 U/L
BASOPHILS # BLD AUTO: 0.07 K/UL
BASOPHILS NFR BLD AUTO: 1.2 %
BILIRUB SERPL-MCNC: 0.5 MG/DL
BUN SERPL-MCNC: 15 MG/DL
CALCIUM SERPL-MCNC: 9.8 MG/DL
CHLORIDE SERPL-SCNC: 107 MMOL/L
CHOLEST SERPL-MCNC: 110 MG/DL
CHOLEST/HDLC SERPL: 2.6 RATIO
CO2 SERPL-SCNC: 24 MMOL/L
CREAT SERPL-MCNC: 1.11 MG/DL
EOSINOPHIL # BLD AUTO: 0.18 K/UL
EOSINOPHIL NFR BLD AUTO: 3 %
GLUCOSE SERPL-MCNC: 97 MG/DL
HCT VFR BLD CALC: 48.1 %
HDLC SERPL-MCNC: 42 MG/DL
HGB BLD-MCNC: 14.9 G/DL
IMM GRANULOCYTES NFR BLD AUTO: 0.2 %
LDLC SERPL CALC-MCNC: 54 MG/DL
LYMPHOCYTES # BLD AUTO: 1.53 K/UL
LYMPHOCYTES NFR BLD AUTO: 25.6 %
MAN DIFF?: NORMAL
MCHC RBC-ENTMCNC: 30.5 PG
MCHC RBC-ENTMCNC: 31 GM/DL
MCV RBC AUTO: 98.6 FL
MONOCYTES # BLD AUTO: 0.53 K/UL
MONOCYTES NFR BLD AUTO: 8.9 %
NEUTROPHILS # BLD AUTO: 3.65 K/UL
NEUTROPHILS NFR BLD AUTO: 61.1 %
PLATELET # BLD AUTO: 196 K/UL
POTASSIUM SERPL-SCNC: 5.6 MMOL/L
PROT SERPL-MCNC: 6.9 G/DL
RBC # BLD: 4.88 M/UL
RBC # FLD: 12.7 %
SODIUM SERPL-SCNC: 146 MMOL/L
TRIGL SERPL-MCNC: 69 MG/DL
WBC # FLD AUTO: 5.97 K/UL

## 2019-06-27 ENCOUNTER — NON-APPOINTMENT (OUTPATIENT)
Age: 54
End: 2019-06-27

## 2019-06-27 ENCOUNTER — APPOINTMENT (OUTPATIENT)
Dept: CARDIOLOGY | Facility: CLINIC | Age: 54
End: 2019-06-27
Payer: COMMERCIAL

## 2019-06-27 VITALS
OXYGEN SATURATION: 98 % | SYSTOLIC BLOOD PRESSURE: 118 MMHG | DIASTOLIC BLOOD PRESSURE: 80 MMHG | HEART RATE: 65 BPM | BODY MASS INDEX: 25.78 KG/M2 | WEIGHT: 203 LBS | HEIGHT: 74.5 IN

## 2019-06-27 PROCEDURE — 99214 OFFICE O/P EST MOD 30 MIN: CPT

## 2019-06-27 PROCEDURE — 93000 ELECTROCARDIOGRAM COMPLETE: CPT

## 2019-06-27 NOTE — PHYSICAL EXAM
[General Appearance - Well Developed] : well developed [General Appearance - In No Acute Distress] : no acute distress [General Appearance - Well Nourished] : well nourished [Normal Conjunctiva] : the conjunctiva exhibited no abnormalities [No Oral Pallor] : no oral pallor [Auscultation Breath Sounds / Voice Sounds] : lungs were clear to auscultation bilaterally [Normal Jugular Venous V Waves Present] : normal jugular venous V waves present [Respiration, Rhythm And Depth] : normal respiratory rhythm and effort [Heart Sounds] : normal S1 and S2 [Arterial Pulses Normal] : the arterial pulses were normal [Edema] : no peripheral edema present [Regular] : the rhythm was regular [FreeTextEntry1] : Well healed midline scar. [Abdomen Soft] : soft [Bowel Sounds] : normal bowel sounds [Abnormal Walk] : normal gait [Nail Clubbing] : no clubbing of the fingernails [Skin Turgor] : normal skin turgor [Oriented To Time, Place, And Person] : oriented to person, place, and time [Impaired Insight] : insight and judgment were intact [Affect] : the affect was normal

## 2019-06-27 NOTE — REVIEW OF SYSTEMS
[Joint Pain] : joint pain [see HPI] : see HPI [Muscle Cramps] : muscle cramps [Negative] : Endocrine

## 2019-06-27 NOTE — DISCUSSION/SUMMARY
[FreeTextEntry1] : He is a 54-year-old with CAD s/p 3 vessel CABG, doing well postoperatively. Normal EF.\par Continued weight loss.\par Will continue toprol xl  25 mg qd. \par Continue asa/statin.\par Encouraged continued aerobic exercise.\par LDL at goal.\par followup in 3 months. Labs today for k and Na.\par

## 2019-07-08 LAB
ANION GAP SERPL CALC-SCNC: 19 MMOL/L
BUN SERPL-MCNC: 18 MG/DL
CALCIUM SERPL-MCNC: 9.8 MG/DL
CHLORIDE SERPL-SCNC: 102 MMOL/L
CO2 SERPL-SCNC: 24 MMOL/L
CREAT SERPL-MCNC: 1.13 MG/DL
GLUCOSE SERPL-MCNC: 113 MG/DL
POTASSIUM SERPL-SCNC: 4.3 MMOL/L
SODIUM SERPL-SCNC: 145 MMOL/L

## 2019-11-07 ENCOUNTER — NON-APPOINTMENT (OUTPATIENT)
Age: 54
End: 2019-11-07

## 2019-11-07 ENCOUNTER — APPOINTMENT (OUTPATIENT)
Dept: CARDIOLOGY | Facility: CLINIC | Age: 54
End: 2019-11-07
Payer: COMMERCIAL

## 2019-11-07 VITALS
OXYGEN SATURATION: 98 % | BODY MASS INDEX: 25.78 KG/M2 | SYSTOLIC BLOOD PRESSURE: 110 MMHG | HEART RATE: 58 BPM | DIASTOLIC BLOOD PRESSURE: 80 MMHG | HEIGHT: 74.5 IN | WEIGHT: 203 LBS

## 2019-11-07 PROCEDURE — 93000 ELECTROCARDIOGRAM COMPLETE: CPT

## 2019-11-07 PROCEDURE — 99214 OFFICE O/P EST MOD 30 MIN: CPT

## 2019-11-07 NOTE — HISTORY OF PRESENT ILLNESS
[FreeTextEntry1] : Sternal pain resolved.\par No angina.\par He continues to exercise routinely. 4.2 mph at an incline of 7 and up. for 30 min. No chest pain or dyspnea.\par Planning colonoscopy in near future.\par \par Prior:\par s/p CABG x 3 with LIMA on 1/31/2018. Postoperative pneumomediastinum.

## 2019-11-07 NOTE — DISCUSSION/SUMMARY
Here with sudden onset of pain and swelling below and behind  her right ear for the last 2 days   She has noticed chills   No sore throat   She is afraid to chew as she feels it will hurt     No sinus issues   No fever   Past Medical History:   Diagnosis Date   • Abnormal Pap     LGSIL   • Anxiety    • Arthritis    • Back pain    • Bronchitis    • Colon Polyp    • Depression    • Epilepsy (CMS/HCC)    • Esophageal reflux    • Fibroid    • Hemorrhoids    • Hyperlipidemia    • Hypertension    • Injury of foot, left 5/2015    Healing sore on top of left foot   • Posterior heel bump     Left foot   • Schizophrenia (CMS/HCC)     schizoaffective disorder    • Seizure (CMS/HCC)     Last episode 11/2014   • Sleep apnea     mild sleep apnea - no C Pap,      Visit Vitals   • /71   • Pulse 95   • Temp 98.3 °F (36.8 °C) (Oral)   • Ht 5' 4\" (1.626 m)   • Wt 87.5 kg   • BMI 33.09 kg/m2     Constitutional:  A+O x 3. In NAD.  HENT:  Normocephalic. Atraumatic. Bilateral external ears normal. Oropharynx moist. No oral exudates. No tonsillar or uvular edema. Nose normal. Right sided swelling in the inferior pole of parotids   Cardiovascular:  Normal rate. Normal rhythm. No murmurs, gallops, or rubs.    Respiratory:  No respiratory distress. Normal breath sounds. No rales. No wheezing.    ASSESSMENT AND PLAN:  1)right parotitis   Will do warm compresses   Will do Augmentin 875 mg bid for 10 days                [FreeTextEntry1] : He is a 54-year-old with CAD s/p 3 vessel CABG, doing well postoperatively. Normal EF.\par Keep weight stable.\par Will continue toprol xl  25 mg qd. \par Continue asa/statin.\par Encouraged continued aerobic exercise.\par LDL at goal.\par followup in 3 months. Labs before then.\par

## 2019-11-07 NOTE — PHYSICAL EXAM
[General Appearance - Well Developed] : well developed [General Appearance - In No Acute Distress] : no acute distress [General Appearance - Well Nourished] : well nourished [No Oral Pallor] : no oral pallor [Normal Conjunctiva] : the conjunctiva exhibited no abnormalities [Normal Jugular Venous V Waves Present] : normal jugular venous V waves present [Respiration, Rhythm And Depth] : normal respiratory rhythm and effort [Auscultation Breath Sounds / Voice Sounds] : lungs were clear to auscultation bilaterally [Heart Sounds] : normal S1 and S2 [Arterial Pulses Normal] : the arterial pulses were normal [Edema] : no peripheral edema present [Bowel Sounds] : normal bowel sounds [Regular] : the rhythm was regular [FreeTextEntry1] : Well healed midline scar. [Abdomen Soft] : soft [Abnormal Walk] : normal gait [Nail Clubbing] : no clubbing of the fingernails [Skin Turgor] : normal skin turgor [Oriented To Time, Place, And Person] : oriented to person, place, and time [Impaired Insight] : insight and judgment were intact [Affect] : the affect was normal

## 2019-11-07 NOTE — REVIEW OF SYSTEMS
[see HPI] : see HPI [Joint Pain] : joint pain [Muscle Cramps] : muscle cramps [Negative] : Endocrine

## 2020-06-08 LAB
ALBUMIN SERPL ELPH-MCNC: 4.9 G/DL
ALP BLD-CCNC: 64 U/L
ALT SERPL-CCNC: 21 U/L
ANION GAP SERPL CALC-SCNC: 10 MMOL/L
AST SERPL-CCNC: 17 U/L
BASOPHILS # BLD AUTO: 0.09 K/UL
BASOPHILS NFR BLD AUTO: 1.2 %
BILIRUB SERPL-MCNC: 0.6 MG/DL
BUN SERPL-MCNC: 15 MG/DL
CALCIUM SERPL-MCNC: 10 MG/DL
CHLORIDE SERPL-SCNC: 105 MMOL/L
CHOLEST SERPL-MCNC: 115 MG/DL
CHOLEST/HDLC SERPL: 2.5 RATIO
CO2 SERPL-SCNC: 27 MMOL/L
CREAT SERPL-MCNC: 1.18 MG/DL
EOSINOPHIL # BLD AUTO: 0.16 K/UL
EOSINOPHIL NFR BLD AUTO: 2.2 %
ESTIMATED AVERAGE GLUCOSE: 123 MG/DL
GLUCOSE SERPL-MCNC: 100 MG/DL
HBA1C MFR BLD HPLC: 5.9 %
HCT VFR BLD CALC: 47.5 %
HDLC SERPL-MCNC: 46 MG/DL
HGB BLD-MCNC: 14.9 G/DL
IMM GRANULOCYTES NFR BLD AUTO: 0.1 %
LDLC SERPL CALC-MCNC: 55 MG/DL
LYMPHOCYTES # BLD AUTO: 2.32 K/UL
LYMPHOCYTES NFR BLD AUTO: 32 %
MAN DIFF?: NORMAL
MCHC RBC-ENTMCNC: 30.3 PG
MCHC RBC-ENTMCNC: 31.4 GM/DL
MCV RBC AUTO: 96.5 FL
MONOCYTES # BLD AUTO: 0.7 K/UL
MONOCYTES NFR BLD AUTO: 9.7 %
NEUTROPHILS # BLD AUTO: 3.97 K/UL
NEUTROPHILS NFR BLD AUTO: 54.8 %
PLATELET # BLD AUTO: 207 K/UL
POTASSIUM SERPL-SCNC: 5.3 MMOL/L
PROT SERPL-MCNC: 7.2 G/DL
RBC # BLD: 4.92 M/UL
RBC # FLD: 12 %
SODIUM SERPL-SCNC: 143 MMOL/L
TRIGL SERPL-MCNC: 72 MG/DL
WBC # FLD AUTO: 7.25 K/UL

## 2020-06-12 ENCOUNTER — NON-APPOINTMENT (OUTPATIENT)
Age: 55
End: 2020-06-12

## 2020-06-12 ENCOUNTER — APPOINTMENT (OUTPATIENT)
Dept: CARDIOLOGY | Facility: CLINIC | Age: 55
End: 2020-06-12
Payer: COMMERCIAL

## 2020-06-12 VITALS
DIASTOLIC BLOOD PRESSURE: 74 MMHG | TEMPERATURE: 98.1 F | WEIGHT: 196 LBS | HEART RATE: 64 BPM | OXYGEN SATURATION: 98 % | SYSTOLIC BLOOD PRESSURE: 116 MMHG | BODY MASS INDEX: 24.89 KG/M2 | HEIGHT: 74.5 IN | RESPIRATION RATE: 17 BRPM

## 2020-06-12 PROCEDURE — 93000 ELECTROCARDIOGRAM COMPLETE: CPT

## 2020-06-12 PROCEDURE — 99213 OFFICE O/P EST LOW 20 MIN: CPT

## 2020-06-12 NOTE — REVIEW OF SYSTEMS
[see HPI] : see HPI [Muscle Cramps] : muscle cramps [Joint Pain] : joint pain [Negative] : Heme/Lymph

## 2020-06-12 NOTE — HISTORY OF PRESENT ILLNESS
[FreeTextEntry1] : He feels fine.\par Still exercising but less. No angina.\par \par Prior:\par He continues to exercise routinely. 4.2 mph at an incline of 7 and up. for 30 min. No chest pain or dyspnea.\par \par s/p CABG x 3 with LIMA on 1/31/2018. Postoperative pneumomediastinum.

## 2020-06-12 NOTE — PHYSICAL EXAM
[General Appearance - Well Developed] : well developed [General Appearance - Well Nourished] : well nourished [General Appearance - In No Acute Distress] : no acute distress [Normal Conjunctiva] : the conjunctiva exhibited no abnormalities [No Oral Pallor] : no oral pallor [Normal Jugular Venous V Waves Present] : normal jugular venous V waves present [Auscultation Breath Sounds / Voice Sounds] : lungs were clear to auscultation bilaterally [Respiration, Rhythm And Depth] : normal respiratory rhythm and effort [Arterial Pulses Normal] : the arterial pulses were normal [Heart Sounds] : normal S1 and S2 [Edema] : no peripheral edema present [FreeTextEntry1] : Well healed midline scar. [Regular] : the rhythm was regular [Bowel Sounds] : normal bowel sounds [Abnormal Walk] : normal gait [Abdomen Soft] : soft [Skin Turgor] : normal skin turgor [Nail Clubbing] : no clubbing of the fingernails [Impaired Insight] : insight and judgment were intact [Oriented To Time, Place, And Person] : oriented to person, place, and time [Affect] : the affect was normal

## 2020-06-12 NOTE — DISCUSSION/SUMMARY
[FreeTextEntry1] : He is a 55-year-old with CAD s/p 3 vessel CABG, doing well postoperatively. Normal EF.\par Keep weight stable.\par Will continue toprol xl  25 mg qd. \par Continue asa/statin.\par Encouraged restarting aerobic exercise.\par LDL 55 is at goal.\par followup in 4 months. Labs before then.\par

## 2020-10-09 ENCOUNTER — RX RENEWAL (OUTPATIENT)
Age: 55
End: 2020-10-09

## 2020-10-12 LAB
ALBUMIN SERPL ELPH-MCNC: 4.8 G/DL
ALP BLD-CCNC: 73 U/L
ALT SERPL-CCNC: 27 U/L
ANION GAP SERPL CALC-SCNC: 11 MMOL/L
AST SERPL-CCNC: 19 U/L
BASOPHILS # BLD AUTO: 0.07 K/UL
BASOPHILS NFR BLD AUTO: 1.2 %
BILIRUB SERPL-MCNC: 0.5 MG/DL
BUN SERPL-MCNC: 15 MG/DL
CALCIUM SERPL-MCNC: 10 MG/DL
CHLORIDE SERPL-SCNC: 103 MMOL/L
CO2 SERPL-SCNC: 27 MMOL/L
CREAT SERPL-MCNC: 1.17 MG/DL
EOSINOPHIL # BLD AUTO: 0.16 K/UL
EOSINOPHIL NFR BLD AUTO: 2.7 %
GLUCOSE SERPL-MCNC: 108 MG/DL
HCT VFR BLD CALC: 46.9 %
HGB BLD-MCNC: 14.9 G/DL
IMM GRANULOCYTES NFR BLD AUTO: 0.2 %
LYMPHOCYTES # BLD AUTO: 1.87 K/UL
LYMPHOCYTES NFR BLD AUTO: 31.6 %
MAN DIFF?: NORMAL
MCHC RBC-ENTMCNC: 30.5 PG
MCHC RBC-ENTMCNC: 31.8 GM/DL
MCV RBC AUTO: 96.1 FL
MONOCYTES # BLD AUTO: 0.65 K/UL
MONOCYTES NFR BLD AUTO: 11 %
NEUTROPHILS # BLD AUTO: 3.16 K/UL
NEUTROPHILS NFR BLD AUTO: 53.3 %
PLATELET # BLD AUTO: 204 K/UL
POTASSIUM SERPL-SCNC: 4.3 MMOL/L
PROT SERPL-MCNC: 7.1 G/DL
RBC # BLD: 4.88 M/UL
RBC # FLD: 12.1 %
SODIUM SERPL-SCNC: 141 MMOL/L
WBC # FLD AUTO: 5.92 K/UL

## 2020-10-22 ENCOUNTER — APPOINTMENT (OUTPATIENT)
Dept: CARDIOLOGY | Facility: CLINIC | Age: 55
End: 2020-10-22
Payer: COMMERCIAL

## 2020-10-22 DIAGNOSIS — N52.9 MALE ERECTILE DYSFUNCTION, UNSPECIFIED: ICD-10-CM

## 2020-10-22 PROCEDURE — 99213 OFFICE O/P EST LOW 20 MIN: CPT

## 2020-10-22 NOTE — HISTORY OF PRESENT ILLNESS
[FreeTextEntry1] : VIDEO VISIT\par \par TeleHealth Video Encounter:\par  \par Initiated by:\par _x_Patient Call\par __Patient Election for TeleHealth visit\par  \par Consented for TeleHealth visit\par Patient Location:  Home\par Physician Location:\par _x_Office(238; [address] [facility], [address], [place], [place], [address])\par __Home\par  \par Narrative:\par COVID exposure in school. quarantined at home.\par BP at home is 111/71,  oxygen saturation is fine. No fever.\par Some difficulty with erectile function, both maintenance and initiaion. \par Using viagra PRN.\par \par Assessment:\par He is a 55-year-old with CAD s/p 3 vessel CABG, doing well postoperatively. Normal EF.\par Keep weight stable.\par Will continue toprol xl 25 mg qd. \par Continue asa/statin.\par Encouraged aerobic exercise.\par \par \par Follow-up:\par followup in 4 months. Labs before then.\par  \par  \par Duration of Encounter:  _15__ minutes, at least 50% of which was spent in direct counseling and coordination of care\par \par  \par

## 2020-12-27 NOTE — H&P CARDIOLOGY - CIGARETTES, PACKS/DAY
0.5 PROVIDER:[TOKEN:[[id number]],FOLLOWUP:[2 weeks]],PROVIDER:[TOKEN:[[id number]],FOLLOWUP:[2 weeks]]

## 2021-02-19 ENCOUNTER — NON-APPOINTMENT (OUTPATIENT)
Age: 56
End: 2021-02-19

## 2021-02-19 ENCOUNTER — APPOINTMENT (OUTPATIENT)
Dept: CARDIOLOGY | Facility: CLINIC | Age: 56
End: 2021-02-19
Payer: COMMERCIAL

## 2021-02-19 VITALS
BODY MASS INDEX: 24.83 KG/M2 | SYSTOLIC BLOOD PRESSURE: 112 MMHG | DIASTOLIC BLOOD PRESSURE: 80 MMHG | OXYGEN SATURATION: 98 % | WEIGHT: 196 LBS | HEART RATE: 56 BPM

## 2021-02-19 PROCEDURE — 99072 ADDL SUPL MATRL&STAF TM PHE: CPT

## 2021-02-19 PROCEDURE — 93000 ELECTROCARDIOGRAM COMPLETE: CPT

## 2021-02-19 PROCEDURE — 99213 OFFICE O/P EST LOW 20 MIN: CPT

## 2021-02-19 NOTE — HISTORY OF PRESENT ILLNESS
[FreeTextEntry1] : He feels fine.\par Still exercising but less during COVID. No angina.\par No DAVIS.\par \par Prior:\par He continues to exercise routinely. 4.2 mph at an incline of 7 and up. for 30 min. No chest pain or dyspnea.\par \par s/p CABG x 3 with LIMA on 1/31/2018. Postoperative pneumomediastinum.

## 2021-02-19 NOTE — PHYSICAL EXAM
[General Appearance - Well Developed] : well developed [General Appearance - Well Nourished] : well nourished [General Appearance - In No Acute Distress] : no acute distress [Normal Conjunctiva] : the conjunctiva exhibited no abnormalities [No Oral Pallor] : no oral pallor [Normal Jugular Venous V Waves Present] : normal jugular venous V waves present [Respiration, Rhythm And Depth] : normal respiratory rhythm and effort [Auscultation Breath Sounds / Voice Sounds] : lungs were clear to auscultation bilaterally [Arterial Pulses Normal] : the arterial pulses were normal [Heart Sounds] : normal S1 and S2 [Edema] : no peripheral edema present [Regular] : the rhythm was regular [FreeTextEntry1] : Well healed midline scar. [Bowel Sounds] : normal bowel sounds [Abdomen Soft] : soft [Abnormal Walk] : normal gait [Nail Clubbing] : no clubbing of the fingernails [Skin Turgor] : normal skin turgor [Oriented To Time, Place, And Person] : oriented to person, place, and time [Impaired Insight] : insight and judgment were intact [Affect] : the affect was normal

## 2021-02-19 NOTE — DISCUSSION/SUMMARY
[FreeTextEntry1] : He is a 56-year-old with CAD s/p 3 vessel CABG in 2017, doing well, Normal EF.\par Will continue toprol xl  25 mg qd. \par Continue asa/statin.\par LDL 55 is at goal previously.\par followup in 4 months. Labs before then.\par

## 2021-05-03 ENCOUNTER — APPOINTMENT (OUTPATIENT)
Dept: CARDIOLOGY | Facility: CLINIC | Age: 56
End: 2021-05-03

## 2021-06-20 LAB
ALBUMIN SERPL ELPH-MCNC: 4.8 G/DL
ALP BLD-CCNC: 70 U/L
ALT SERPL-CCNC: 20 U/L
ANION GAP SERPL CALC-SCNC: 12 MMOL/L
AST SERPL-CCNC: 15 U/L
BASOPHILS # BLD AUTO: 0.08 K/UL
BASOPHILS NFR BLD AUTO: 1.2 %
BILIRUB SERPL-MCNC: 0.4 MG/DL
BUN SERPL-MCNC: 17 MG/DL
CALCIUM SERPL-MCNC: 9.9 MG/DL
CHLORIDE SERPL-SCNC: 103 MMOL/L
CHOLEST SERPL-MCNC: 114 MG/DL
CO2 SERPL-SCNC: 27 MMOL/L
CREAT SERPL-MCNC: 1.12 MG/DL
EOSINOPHIL # BLD AUTO: 0.15 K/UL
EOSINOPHIL NFR BLD AUTO: 2.3 %
ESTIMATED AVERAGE GLUCOSE: 117 MG/DL
GLUCOSE SERPL-MCNC: 93 MG/DL
HBA1C MFR BLD HPLC: 5.7 %
HCT VFR BLD CALC: 44.8 %
HDLC SERPL-MCNC: 42 MG/DL
HGB BLD-MCNC: 14.4 G/DL
IMM GRANULOCYTES NFR BLD AUTO: 0.3 %
LDLC SERPL CALC-MCNC: 54 MG/DL
LYMPHOCYTES # BLD AUTO: 2.13 K/UL
LYMPHOCYTES NFR BLD AUTO: 33.2 %
MAN DIFF?: NORMAL
MCHC RBC-ENTMCNC: 30.8 PG
MCHC RBC-ENTMCNC: 32.1 GM/DL
MCV RBC AUTO: 95.9 FL
MONOCYTES # BLD AUTO: 0.63 K/UL
MONOCYTES NFR BLD AUTO: 9.8 %
NEUTROPHILS # BLD AUTO: 3.4 K/UL
NEUTROPHILS NFR BLD AUTO: 53.2 %
NONHDLC SERPL-MCNC: 72 MG/DL
PLATELET # BLD AUTO: 205 K/UL
POTASSIUM SERPL-SCNC: 4.8 MMOL/L
PROT SERPL-MCNC: 6.8 G/DL
RBC # BLD: 4.67 M/UL
RBC # FLD: 12.2 %
SODIUM SERPL-SCNC: 142 MMOL/L
TRIGL SERPL-MCNC: 90 MG/DL
TSH SERPL-ACNC: 2.33 UIU/ML
WBC # FLD AUTO: 6.41 K/UL

## 2021-06-27 ENCOUNTER — RESULT CHARGE (OUTPATIENT)
Age: 56
End: 2021-06-27

## 2021-06-28 ENCOUNTER — APPOINTMENT (OUTPATIENT)
Dept: CARDIOLOGY | Facility: CLINIC | Age: 56
End: 2021-06-28
Payer: COMMERCIAL

## 2021-06-28 ENCOUNTER — NON-APPOINTMENT (OUTPATIENT)
Age: 56
End: 2021-06-28

## 2021-06-28 VITALS
HEIGHT: 74.5 IN | BODY MASS INDEX: 25.4 KG/M2 | DIASTOLIC BLOOD PRESSURE: 76 MMHG | SYSTOLIC BLOOD PRESSURE: 112 MMHG | OXYGEN SATURATION: 97 % | WEIGHT: 200 LBS | HEART RATE: 63 BPM

## 2021-06-28 PROCEDURE — 99213 OFFICE O/P EST LOW 20 MIN: CPT

## 2021-06-28 PROCEDURE — 93000 ELECTROCARDIOGRAM COMPLETE: CPT

## 2021-06-28 NOTE — DISCUSSION/SUMMARY
[FreeTextEntry1] : He is a 56 year old with CAD s/p 3 vessel CABG in 2017, doing well, Normal EF.\par He will continue metoprolol succinate 25 mg daily for secondary prevention.\par CAD- LDL 54. Continue daily aspirin and statin therapy for cardiovascular and stroke risk reduction\par \par Follow up in 6 months, or sooner if necessary.\par Labs prior to next visit. \par

## 2021-06-28 NOTE — HISTORY OF PRESENT ILLNESS
[FreeTextEntry1] : Today he is feeling well. He continues to exercise regularly by brisk walks, occasional biking and will be rejoining his gym soon. He denies any chest pain or exertional shortness of breath. His medications were reviewed and he is taking all as directed.\par \par \par Prior:\par He feels fine.\par Still exercising but less during COVID. No angina.\par No DAVIS.\par \par Prior:\par He continues to exercise routinely. 4.2 mph at an incline of 7 and up. for 30 min. No chest pain or dyspnea.\par \par s/p CABG x 3 with LIMA on 1/31/2018. Postoperative pneumomediastinum.

## 2021-06-28 NOTE — PHYSICAL EXAM
[General Appearance - Well Developed] : well developed [General Appearance - Well Nourished] : well nourished [General Appearance - In No Acute Distress] : no acute distress [Normal Conjunctiva] : the conjunctiva exhibited no abnormalities [No Oral Pallor] : no oral pallor [Normal Jugular Venous V Waves Present] : normal jugular venous V waves present [Respiration, Rhythm And Depth] : normal respiratory rhythm and effort [Auscultation Breath Sounds / Voice Sounds] : lungs were clear to auscultation bilaterally [Heart Sounds] : normal S1 and S2 [Arterial Pulses Normal] : the arterial pulses were normal [Edema] : no peripheral edema present [Regular] : the rhythm was regular [Bowel Sounds] : normal bowel sounds [Abdomen Soft] : soft [Abnormal Walk] : normal gait [Nail Clubbing] : no clubbing of the fingernails [Skin Turgor] : normal skin turgor [Oriented To Time, Place, And Person] : oriented to person, place, and time [Impaired Insight] : insight and judgment were intact [Affect] : the affect was normal [FreeTextEntry1] : Well healed midline scar.

## 2022-01-04 ENCOUNTER — APPOINTMENT (OUTPATIENT)
Dept: GASTROENTEROLOGY | Facility: CLINIC | Age: 57
End: 2022-01-04
Payer: COMMERCIAL

## 2022-01-04 VITALS
HEIGHT: 74 IN | WEIGHT: 200 LBS | TEMPERATURE: 98 F | HEART RATE: 88 BPM | DIASTOLIC BLOOD PRESSURE: 80 MMHG | SYSTOLIC BLOOD PRESSURE: 125 MMHG | OXYGEN SATURATION: 98 % | BODY MASS INDEX: 25.67 KG/M2

## 2022-01-04 DIAGNOSIS — Z12.11 ENCOUNTER FOR SCREENING FOR MALIGNANT NEOPLASM OF COLON: ICD-10-CM

## 2022-01-04 DIAGNOSIS — R10.9 UNSPECIFIED ABDOMINAL PAIN: ICD-10-CM

## 2022-01-04 DIAGNOSIS — Z80.0 ENCOUNTER FOR SCREENING FOR MALIGNANT NEOPLASM OF COLON: ICD-10-CM

## 2022-01-04 PROCEDURE — 99204 OFFICE O/P NEW MOD 45 MIN: CPT

## 2022-01-04 NOTE — HISTORY OF PRESENT ILLNESS
[Heartburn] : denies heartburn [Nausea] : denies nausea [Vomiting] : denies vomiting [Diarrhea] : denies diarrhea [Constipation] : denies constipation [Yellow Skin Or Eyes (Jaundice)] : denies jaundice [Abdominal Swelling] : denies abdominal swelling [Rectal Pain] : denies rectal pain [Abdominal Pain] : abdominal pain [Abdominal Surgery] : abdominal surgery [Appendectomy] : appendectomy [Wt Gain ___ Lbs] : no recent weight gain [Wt Loss ___ Lbs] : no recent weight loss [GERD] : no gastroesophageal reflux disease [Hiatus Hernia] : no hiatus hernia [Peptic Ulcer Disease] : no peptic ulcer disease [Pancreatitis] : no pancreatitis [Cholelithiasis] : no cholelithiasis [Kidney Stone] : no kidney stone [Inflammatory Bowel Disease] : no inflammatory bowel disease [Irritable Bowel Syndrome] : no irritable bowel syndrome [Diverticulitis] : no diverticulitis [Alcohol Abuse] : no alcohol abuse [Malignancy] : no malignancy [Cholecystectomy] : no cholecystectomy [de-identified] : SATURNINO GUTIERREZ 56 year M with GERD, CABG x 3, colonic polyps, umbilical hernia repair, FH of colon cancer and LOAN here for colonoscopy and abdominal pain.\par \par Patient states of a good appetite, no loss of weight, bowel movement once daily, formed and brown stools without blood or mucus. Denies nausea, vomiting, melena, hematochezia, or hematemesis. He denies intake of NSAID's.\par \par He used to suffer from GERD aged 20's and was on Prilosec. He stopped alcohol and pain resolved. Currently, complains of epigastric pain 6/10 especially at night.  He takes pantoprazole with relief. He denies any associated features or triggers.\par \par Last colonoscopy 3 years ago with polyps.\par \par FH father with colon cancer diagnosed at 59 years.

## 2022-01-04 NOTE — PHYSICAL EXAM
[General Appearance - Alert] : alert [General Appearance - In No Acute Distress] : in no acute distress [Sclera] : the sclera and conjunctiva were normal [PERRL With Normal Accommodation] : pupils were equal in size, round, and reactive to light [Extraocular Movements] : extraocular movements were intact [Outer Ear] : the ears and nose were normal in appearance [Oropharynx] : the oropharynx was normal [Neck Appearance] : the appearance of the neck was normal [Neck Cervical Mass (___cm)] : no neck mass was observed [Jugular Venous Distention Increased] : there was no jugular-venous distention [Thyroid Diffuse Enlargement] : the thyroid was not enlarged [Thyroid Nodule] : there were no palpable thyroid nodules [Auscultation Breath Sounds / Voice Sounds] : lungs were clear to auscultation bilaterally [Heart Rate And Rhythm] : heart rate was normal and rhythm regular [Heart Sounds] : normal S1 and S2 [Heart Sounds Gallop] : no gallops [Murmurs] : no murmurs [Heart Sounds Pericardial Friction Rub] : no pericardial rub [Full Pulse] : the pedal pulses are present [Edema] : there was no peripheral edema [Breast Appearance] : normal in appearance [Breast Palpation Mass] : no palpable masses [Normal] : normal [Soft, Nontender] : the abdomen was soft and nontender [No Mass] : no masses were palpated [No HSM] : no hepatosplenomegaly noted [Cervical Lymph Nodes Enlarged Posterior Bilaterally] : posterior cervical [Cervical Lymph Nodes Enlarged Anterior Bilaterally] : anterior cervical [Supraclavicular Lymph Nodes Enlarged Bilaterally] : supraclavicular [Axillary Lymph Nodes Enlarged Bilaterally] : axillary [Femoral Lymph Nodes Enlarged Bilaterally] : femoral [Inguinal Lymph Nodes Enlarged Bilaterally] : inguinal [No CVA Tenderness] : no ~M costovertebral angle tenderness [No Spinal Tenderness] : no spinal tenderness [Abnormal Walk] : normal gait [Nail Clubbing] : no clubbing  or cyanosis of the fingernails [Musculoskeletal - Swelling] : no joint swelling seen [Motor Tone] : muscle strength and tone were normal [Skin Color & Pigmentation] : normal skin color and pigmentation [Skin Turgor] : normal skin turgor [] : no rash [Deep Tendon Reflexes (DTR)] : deep tendon reflexes were 2+ and symmetric [Sensation] : the sensory exam was normal to light touch and pinprick [No Focal Deficits] : no focal deficits [Oriented To Time, Place, And Person] : oriented to person, place, and time [Impaired Insight] : insight and judgment were intact [Affect] : the affect was normal

## 2022-01-04 NOTE — CONSULT LETTER
[Dear  ___] : Dear  [unfilled], [Consult Letter:] : I had the pleasure of evaluating your patient, [unfilled]. [Please see my note below.] : Please see my note below. [Consult Closing:] : Thank you very much for allowing me to participate in the care of this patient.  If you have any questions, please do not hesitate to contact me. [Sincerely,] : Sincerely, [FreeTextEntry3] : Valentin Gonzales MD\par \par Assistant Clinical Professor \par Division of Gastroenterology at [facility]\par [facility]\par [facility]\par [facility]\par \par [address], [address], [place] [address]\par Tel: [phone] | Fax: [phone]\par \par Twitter (Personal): @[email or web address] \par \par \par

## 2022-01-04 NOTE — ASSESSMENT
[FreeTextEntry1] : SATURNINO GUTIERREZ 56 year M with GERD, CABG x 3, colonic polyps, umbilical hernia repair, FH of colon cancer and LOAN here for colonoscopy. and abdominal pain\par \par 1. Abdominal pain\par -I am unsure of the etiology of his abdominal pain but given he suffers from nocturnal symptoms I will go ahead and schedule an endoscopy to rule out peptic ulcer disease.\par \par 2. Colon Cancer Screening\par \par - High risk due to family history of CRC.\par \par - Discussed the potential benefits and substantial risks, including but not limited to perforation and/or injury to adjacent blood vessels, nerves and organs, bleeding, infection, need for surgery and death. There is also possibility that a lesion could be missed during the exam especially with suboptimal bowel preparation. The potential benefits include earlier identification and treatment of abnormalities during the exam. Will schedule patient for screening colonoscopy\par \par Bowel prep instructions were reviewed and brochure given.\par \par Follow up in 3 months\par

## 2022-01-04 NOTE — ADDENDUM
[FreeTextEntry1] : The risks and benefits of my recommendations, as well as other treatment options were discussed with the patient today. Questions were answered.\par \par Please feel free to contact for any questions or concerns at my office  in the telephone numbers listed below.\par \par [address], [address], [place], [place], [address] Telephone: [phone] Fax: [phone]\par \par \par

## 2022-01-11 DIAGNOSIS — Z00.00 ENCOUNTER FOR GENERAL ADULT MEDICAL EXAMINATION W/OUT ABNORMAL FINDINGS: ICD-10-CM

## 2022-01-19 ENCOUNTER — RESULT REVIEW (OUTPATIENT)
Age: 57
End: 2022-01-19

## 2022-01-19 ENCOUNTER — OUTPATIENT (OUTPATIENT)
Dept: OUTPATIENT SERVICES | Facility: HOSPITAL | Age: 57
LOS: 1 days | Discharge: HOME | End: 2022-01-19
Payer: COMMERCIAL

## 2022-01-19 ENCOUNTER — APPOINTMENT (OUTPATIENT)
Dept: GASTROENTEROLOGY | Facility: HOSPITAL | Age: 57
End: 2022-01-19

## 2022-01-19 VITALS
DIASTOLIC BLOOD PRESSURE: 79 MMHG | RESPIRATION RATE: 16 BRPM | SYSTOLIC BLOOD PRESSURE: 123 MMHG | TEMPERATURE: 98 F | HEART RATE: 89 BPM

## 2022-01-19 VITALS
OXYGEN SATURATION: 98 % | RESPIRATION RATE: 18 BRPM | DIASTOLIC BLOOD PRESSURE: 76 MMHG | SYSTOLIC BLOOD PRESSURE: 100 MMHG

## 2022-01-19 DIAGNOSIS — Z98.890 OTHER SPECIFIED POSTPROCEDURAL STATES: Chronic | ICD-10-CM

## 2022-01-19 DIAGNOSIS — Z87.39 PERSONAL HISTORY OF OTHER DISEASES OF THE MUSCULOSKELETAL SYSTEM AND CONNECTIVE TISSUE: Chronic | ICD-10-CM

## 2022-01-19 DIAGNOSIS — K21.9 GASTRO-ESOPHAGEAL REFLUX DISEASE WITHOUT ESOPHAGITIS: ICD-10-CM

## 2022-01-19 PROCEDURE — 45390 COLONOSCOPY W/RESECTION: CPT

## 2022-01-19 PROCEDURE — 44361 SMALL BOWEL ENDOSCOPY/BIOPSY: CPT

## 2022-01-19 PROCEDURE — 88305 TISSUE EXAM BY PATHOLOGIST: CPT | Mod: 26

## 2022-01-19 NOTE — ASU PATIENT PROFILE, ADULT - FALL HARM RISK - UNIVERSAL INTERVENTIONS
Bed in lowest position, wheels locked, appropriate side rails in place/Call bell, personal items and telephone in reach/Instruct patient to call for assistance before getting out of bed or chair/Non-slip footwear when patient is out of bed/[place] to call system/Physically safe environment - no spills, clutter or unnecessary equipment/Purposeful Proactive Rounding/Room/bathroom lighting operational, light cord in reach

## 2022-01-19 NOTE — ASU PREOP CHECKLIST - ANTIBIOTIC
Total Volume Injected (Ccs- Only Use Numbers And Decimals): 1 Administered By (Optional): Dr. Robles Concentration Of Solution Injected (Mg/Ml): 2.5 Detail Level: Simple Medical Necessity Clause: This procedure was medically necessary because the lesions that were treated were: Include Z78.9 (Other Specified Conditions Influencing Health Status) As An Associated Diagnosis?: No Kenalog Preparation: Kenalog Consent: The risks of atrophy were reviewed with the patient. X Size Of Lesion In Cm (Optional): 0 n/a

## 2022-01-24 ENCOUNTER — NON-APPOINTMENT (OUTPATIENT)
Age: 57
End: 2022-01-24

## 2022-01-24 LAB — SURGICAL PATHOLOGY STUDY: SIGNIFICANT CHANGE UP

## 2022-02-01 LAB
ALBUMIN SERPL ELPH-MCNC: 4.5 G/DL
ALP BLD-CCNC: 72 U/L
ALT SERPL-CCNC: 19 U/L
ANION GAP SERPL CALC-SCNC: 11 MMOL/L
AST SERPL-CCNC: 16 U/L
BASOPHILS # BLD AUTO: 0.1 K/UL
BASOPHILS NFR BLD AUTO: 1.4 %
BILIRUB SERPL-MCNC: 0.4 MG/DL
BUN SERPL-MCNC: 17 MG/DL
CALCIUM SERPL-MCNC: 9.6 MG/DL
CHLORIDE SERPL-SCNC: 104 MMOL/L
CHOLEST SERPL-MCNC: 127 MG/DL
CO2 SERPL-SCNC: 25 MMOL/L
CREAT SERPL-MCNC: 1.12 MG/DL
EOSINOPHIL # BLD AUTO: 0.14 K/UL
EOSINOPHIL NFR BLD AUTO: 2 %
ESTIMATED AVERAGE GLUCOSE: 120 MG/DL
GLUCOSE SERPL-MCNC: 97 MG/DL
HBA1C MFR BLD HPLC: 5.8 %
HCT VFR BLD CALC: 45.8 %
HDLC SERPL-MCNC: 47 MG/DL
HGB BLD-MCNC: 14.6 G/DL
IMM GRANULOCYTES NFR BLD AUTO: 0.3 %
LDLC SERPL CALC-MCNC: 61 MG/DL
LYMPHOCYTES # BLD AUTO: 1.94 K/UL
LYMPHOCYTES NFR BLD AUTO: 27.1 %
MAN DIFF?: NORMAL
MCHC RBC-ENTMCNC: 30.3 PG
MCHC RBC-ENTMCNC: 31.9 GM/DL
MCV RBC AUTO: 95 FL
MONOCYTES # BLD AUTO: 0.75 K/UL
MONOCYTES NFR BLD AUTO: 10.5 %
NEUTROPHILS # BLD AUTO: 4.2 K/UL
NEUTROPHILS NFR BLD AUTO: 58.7 %
NONHDLC SERPL-MCNC: 80 MG/DL
PLATELET # BLD AUTO: 216 K/UL
POTASSIUM SERPL-SCNC: 5.1 MMOL/L
PROT SERPL-MCNC: 6.9 G/DL
RBC # BLD: 4.82 M/UL
RBC # FLD: 12.1 %
SODIUM SERPL-SCNC: 140 MMOL/L
TRIGL SERPL-MCNC: 94 MG/DL
TSH SERPL-ACNC: 2.44 UIU/ML
WBC # FLD AUTO: 7.15 K/UL

## 2022-02-07 ENCOUNTER — APPOINTMENT (OUTPATIENT)
Dept: CARDIOLOGY | Facility: CLINIC | Age: 57
End: 2022-02-07
Payer: COMMERCIAL

## 2022-02-07 ENCOUNTER — NON-APPOINTMENT (OUTPATIENT)
Age: 57
End: 2022-02-07

## 2022-02-07 VITALS
SYSTOLIC BLOOD PRESSURE: 119 MMHG | DIASTOLIC BLOOD PRESSURE: 78 MMHG | HEART RATE: 60 BPM | WEIGHT: 206 LBS | OXYGEN SATURATION: 99 % | BODY MASS INDEX: 26.44 KG/M2 | HEIGHT: 74 IN

## 2022-02-07 PROCEDURE — 99213 OFFICE O/P EST LOW 20 MIN: CPT

## 2022-02-07 PROCEDURE — 93000 ELECTROCARDIOGRAM COMPLETE: CPT

## 2022-02-07 NOTE — DISCUSSION/SUMMARY
[FreeTextEntry1] : He is a 56 year old with CAD s/p 3 vessel CABG in 2017, doing well, Normal EF.\par He will continue metoprolol succinate 25 mg daily for secondary prevention.\par CAD- LDL 61. \par Continue daily aspirin and statin therapy for cardiovascular and stroke risk reduction\par Encouraged more vigorous activity.\par Follow up in 6 months, or sooner if necessary.\par Labs prior to next visit. \par

## 2022-02-07 NOTE — HISTORY OF PRESENT ILLNESS
After Visit Summary   10/5/2018    Roque De    MRN: [id number]           Patient Information     Date Of Birth          2014        Visit Information        Provider Department      10/5/2018 7:40 PM Eric Pillai APRN [facility]        Today's Diagnoses     Influenza B    -  1    Throat pain        Cough          Care Instructions      Influenza (Child)    Influenza is also called the flu. It is a viral illness that affects the air passages of your lungs. It is different from the common cold. The flu can easily be passed from one to person to another. It may be spread through the air by coughing and sneezing. Or it can be spread by touching the sick person and then touching your own eyes, nose, or mouth.  Symptoms of the flu may be mild or severe. They can include extreme tiredness (wanting to stay in bed all day), chills, fevers, muscle aches, soreness with eye movement, headache, and a dry, hacking cough.  Your child usually won t need to take antibiotics, unless he or she has a complication. This might be an ear or sinus infection or pneumonia.  Home care  Follow these guidelines when caring for your child at home:    Fluids. Fever increases the amount of water your child loses from his or her body. For babies younger than 1 year old, keep giving regular feedings (formula or breast). Talk with your child s healthcare provider to find out how much fluid your baby should be getting. If needed, give an oral rehydration solution. You can buy this at the grocery or pharmacy without a prescription. For a child older than 1 year, give him or her more fluids and continue his or her normal diet. If your child is dehydrated, give an oral rehydration solution. Go back to your child s normal diet as soon as possible. If your child has diarrhea, don t give juice, flavored gelatin water, soft drinks without caffeine, lemonade, fruit drinks, or popsicles. This may  [FreeTextEntry1] : Feels well.\par Routine readmill use. 4.2 mph 4% for 30 min.\par No exertional dyspnea or chest pain.\par No changes to meds.\par \par \par Prior:\par Today he is feeling well. He continues to exercise regularly by brisk walks, occasional biking and will be rejoining his gym soon. He denies any chest pain or exertional shortness of breath. His medications were reviewed and he is taking all as directed.\par \par \par Prior:\par He feels fine.\par Still exercising but less during COVID. No angina.\par No DAVIS.\par \par Prior:\par He continues to exercise routinely. 4.2 mph at an incline of 7 and up. for 30 min. No chest pain or dyspnea.\par \par s/p CABG x 3 with LIMA on 1/31/2018. Postoperative pneumomediastinum.  make diarrhea worse.    Food. If your child doesn t want to eat solid foods, it s OK for a few days. Make sure your child drinks lots of fluid and has a normal amount of urine.    Activity. Keep children with fever at home resting or playing quietly. Encourage your child to take naps. Your child may go back to  or school when the fever is gone for at least 24 hours. The fever should be gone without giving your child acetaminophen or other medicine to reduce fever. Your child should also be eating well and feeling better.    Sleep. It s normal for your child to be unable to sleep or be irritable if he or she has the flu. A child who has congestion will sleep best with his or her head and upper body raised up. Or you can raise the head of the bed frame on a 6-inch block.    Cough. Coughing is a normal part of the flu. You can use a cool mist humidifier at the bedside. Don t give over-the-counter cough and cold medicines to children younger than 6 years of age, unless the healthcare provider tells you to do so. These medicines don t help ease symptoms. And they can cause serious side effects, especially in babies younger than 2 years of age. Don t allow anyone to smoke around your child. Smoke can make the cough worse.    Nasal congestion. Use a rubber bulb syringe to suction the nose of a baby. You may put 2 to 3 drops of saltwater (saline) nose drops in each nostril before suctioning. This will help remove secretions. You can buy saline nose drops without a prescription. You can make the drops yourself by adding 1/4 teaspoon table salt to 1 cup of water.    Fever. Use acetaminophen to control pain, unless another medicine was prescribed. In infants older than 6 months of age, you may use ibuprofen instead of acetaminophen. If your child has chronic liver or kidney disease, talk with your child s provider before using these medicines. Also talk with the provider if your child has ever had a stomach ulcer or GI  "(gastrointestinal) bleeding. Don t give aspirin to anyone younger than 18 years old who is ill with a fever. It may cause severe liver damage.  Follow-up care  Follow up with your child s healthcare provider, or as advised.  When to seek medical advice  Call your child s healthcare provider right away if any of these occur:    Your child has a fever, as directed by the healthcare provider, or:  ? Your child is younger than 12 weeks old and has a fever of 100.4 F (38 C) or higher. Your baby may need to be seen by a healthcare provider.  ? Your child has repeated fevers above 104 F (40 C) at any age.  ? Your child is younger than 2 years old and his or her fever continues for more than 24 hours.  ? Your child is 2 years old or older and his or her fever continues for more than 3 days.    Fast breathing. In a child age 6 weeks to 2 years, this is more than 45 breaths per minute. In a child 3 to 6 years, this is more than 35 breaths per minute. In a child 7 to 10 years, this is more than 30 breaths per minute. In a child older than 10 years, this is more than 25 breaths per minute.    Earache, sinus pain, stiff or painful neck, headache, or repeated diarrhea or vomiting    Unusual fussiness, drowsiness, or confusion    Your child doesn t interact with you as he or she normally does    Your child doesn t want to be held    Your child is not drinking enough fluid. This may show as no tears when crying, or \"sunken\" eyes or dry mouth. It may also be no wet diapers for 8 hours in a baby. Or it may be less urine than usual in older children.    Rash with fever  Date Last Reviewed: 1/1/2017 2000-2017 [employer]. [address] [place] [address], [place] [address]. All rights reserved. This information is not intended as a substitute for professional medical care. Always follow your healthcare professional's instructions.                Follow-ups after your visit        Follow-up notes from your care team     Return if " symptoms worsen or fail to improve.      Who to contact     If you have questions or need follow up information about today's clinic visit or your schedule please contact [facility] directly at [phone].  Normal or non-critical lab and imaging results will be communicated to you by MyChart, letter or phone within 4 business days after the clinic has received the results. If you do not hear from us within 7 days, please contact the clinic through MyChart or phone. If you have a critical or abnormal lab result, we will notify you by phone as soon as possible.  Submit refill requests through [facility] or call your pharmacy and they will forward the refill request to us. Please allow 3 business days for your refill to be completed.          Additional Information About Your Visit        [facility] Information     [facility] lets you send messages to your doctor, view your test results, renew your prescriptions, schedule appointments and more. To sign up, go to [email or web address], contact your [place] clinic or call [phone] during business hours.            Care EveryWhere ID     This is your Care EveryWhere ID. This could be used by other organizations to access your [facility] medical records  [id number]        Your Vitals Were     Pulse Temperature Pulse Oximetry             60 98.4  F (36.9  C) (Tympanic) 96%          Blood Pressure from Last 3 Encounters:   10/05/18 101/78    Weight from Last 3 Encounters:   10/05/18 70 lb (31.8 kg) (>99 %)*   09/21/18 69 lb (31.3 kg) (>99 %)*   07/02/18 (!) 68 lb 9.6 oz (31.1 kg) (>99 %)*     * Growth percentiles are based on CDC 2-20 Years data.              We Performed the Following     Beta strep group A culture     Influenza A/B antigen     Strep, Rapid Screen        Primary Care Provider Office Phone # Fax #    Jolanta eVla [phone]       [address]  [facility] [address]        Equal Access to Services      MJ PHELPS : Hadii aad ku mariza Bravo, waaxda luqadaha, qaybta kaalmada adeayesha, aakash janet reynaric mcrae imanjanel ramsey. So [facility] [phone].    ATENCIÓN: Si habla español, tiene a hwang disposición servicios gratuitos de asistencia lingüística. Llame al [phone].    We comply with applicable federal civil rights laws and [place] laws. We do not discriminate on the basis of race, color, national origin, age, disability, sex, sexual orientation, or gender identity.            Thank you!     Thank you for choosing [facility]  for your care. Our goal is always to provide you with excellent care. Hearing back from our patients is one way we can continue to improve our services. Please take a few minutes to complete the written survey that you may receive in the mail after your visit with us. Thank you!             Your Updated Medication List - Protect others around you: Learn how to safely use, store and throw away your medicines at www.disposemymeds.org.          This list is accurate as of 10/5/18  9:08 PM.  Always use your most recent med list.                   Brand Name Dispense Instructions for use Diagnosis    ammonium lactate 12 % cream    LAC-HYDRIN    385 g    Apply topically 2 times daily as needed for dry skin    Keratosis pilaris       clotrimazole 1 % cream    LOTRIMIN    15 g    Apply topically 2 times daily    Tinea corporis       emollient cream     453 g    Apply topically 2 times daily    Keratosis pilaris, Peeling skin       hydrocortisone 1 % cream    CORTAID    60 g    Apply sparingly to affected area three times daily for 14 days.    Dermatitis

## 2022-02-25 ENCOUNTER — TRANSCRIPTION ENCOUNTER (OUTPATIENT)
Age: 57
End: 2022-02-25

## 2022-05-30 NOTE — PHYSICAL THERAPY INITIAL EVALUATION ADULT - PATIENT PROFILE REVIEW, REHAB EVAL
yes
You can access the FollowMyHealth Patient Portal offered by [facility] by registering at the following website: [email or web address]. By joining [employer]’s FollowMyHealth portal, you will also be able to view your health information using other applications (apps) compatible with our system.

## 2022-07-29 LAB
ALBUMIN SERPL ELPH-MCNC: 4.7 G/DL
ALP BLD-CCNC: 70 U/L
ALT SERPL-CCNC: 17 U/L
ANION GAP SERPL CALC-SCNC: 12 MMOL/L
AST SERPL-CCNC: 19 U/L
BASOPHILS # BLD AUTO: 0.08 K/UL
BASOPHILS NFR BLD AUTO: 1.1 %
BILIRUB SERPL-MCNC: 0.6 MG/DL
BUN SERPL-MCNC: 16 MG/DL
CALCIUM SERPL-MCNC: 9.7 MG/DL
CHLORIDE SERPL-SCNC: 104 MMOL/L
CHOLEST SERPL-MCNC: 108 MG/DL
CO2 SERPL-SCNC: 25 MMOL/L
CREAT SERPL-MCNC: 1.12 MG/DL
EGFR: 76 ML/MIN/1.73M2
EOSINOPHIL # BLD AUTO: 0.11 K/UL
EOSINOPHIL NFR BLD AUTO: 1.6 %
ESTIMATED AVERAGE GLUCOSE: 123 MG/DL
GLUCOSE SERPL-MCNC: 90 MG/DL
HBA1C MFR BLD HPLC: 5.9 %
HCT VFR BLD CALC: 44.2 %
HDLC SERPL-MCNC: 43 MG/DL
HGB BLD-MCNC: 14.5 G/DL
IMM GRANULOCYTES NFR BLD AUTO: 0.1 %
LDLC SERPL CALC-MCNC: 52 MG/DL
LYMPHOCYTES # BLD AUTO: 2.41 K/UL
LYMPHOCYTES NFR BLD AUTO: 34.3 %
MAN DIFF?: NORMAL
MCHC RBC-ENTMCNC: 30.1 PG
MCHC RBC-ENTMCNC: 32.8 GM/DL
MCV RBC AUTO: 91.9 FL
MONOCYTES # BLD AUTO: 0.68 K/UL
MONOCYTES NFR BLD AUTO: 9.7 %
NEUTROPHILS # BLD AUTO: 3.74 K/UL
NEUTROPHILS NFR BLD AUTO: 53.2 %
NONHDLC SERPL-MCNC: 64 MG/DL
PLATELET # BLD AUTO: 201 K/UL
POTASSIUM SERPL-SCNC: 5 MMOL/L
PROT SERPL-MCNC: 6.8 G/DL
RBC # BLD: 4.81 M/UL
RBC # FLD: 12.2 %
SODIUM SERPL-SCNC: 141 MMOL/L
TRIGL SERPL-MCNC: 61 MG/DL
TSH SERPL-ACNC: 1.82 UIU/ML
WBC # FLD AUTO: 7.03 K/UL

## 2022-08-11 ENCOUNTER — APPOINTMENT (OUTPATIENT)
Dept: CARDIOLOGY | Facility: CLINIC | Age: 57
End: 2022-08-11

## 2022-08-11 ENCOUNTER — NON-APPOINTMENT (OUTPATIENT)
Age: 57
End: 2022-08-11

## 2022-08-11 VITALS
DIASTOLIC BLOOD PRESSURE: 88 MMHG | OXYGEN SATURATION: 97 % | HEIGHT: 74 IN | WEIGHT: 206 LBS | SYSTOLIC BLOOD PRESSURE: 116 MMHG | HEART RATE: 79 BPM | BODY MASS INDEX: 26.44 KG/M2 | RESPIRATION RATE: 17 BRPM

## 2022-08-11 PROCEDURE — 93000 ELECTROCARDIOGRAM COMPLETE: CPT

## 2022-08-11 PROCEDURE — 99214 OFFICE O/P EST MOD 30 MIN: CPT | Mod: 25

## 2022-08-11 NOTE — HISTORY OF PRESENT ILLNESS
[FreeTextEntry1] : LDL 52. \par A1c is 5.9.\par He has been going to the gym, 30 min aerobic activity. Working out more.\par No bleeding.\par No DAVIS or chest pains.\par Wakes up with numbness in his fingers.\par \par Prior:\par Feels well.\par Routine readmill use. 4.2 mph 4% for 30 min.\par No exertional dyspnea or chest pain.\par No changes to meds.\par \par \par Prior:\par Today he is feeling well. He continues to exercise regularly by brisk walks, occasional biking and will be rejoining his gym soon. He denies any chest pain or exertional shortness of breath. His medications were reviewed and he is taking all as directed.\par \par Prior:\par He feels fine.\par Still exercising but less during COVID. No angina.\par No DAVIS.\par \par Prior:\par He continues to exercise routinely. 4.2 mph at an incline of 7 and up. for 30 min. No chest pain or dyspnea.\par \par s/p CABG x 3 with LIMA on 1/31/2018. Postoperative pneumomediastinum.

## 2022-08-11 NOTE — DISCUSSION/SUMMARY
[FreeTextEntry1] : He is a 57 year old with CAD s/p 3 vessel CABG in 2017, doing well, Normal EF.\par CAD: No angina. He will continue metoprolol succinate 25 mg daily for secondary prevention.\par CAD- LDL at goal and daily aspirin and statin therapy for cardiovascular and stroke risk reduction\par Encouraged vigorous activity.\par Follow up in 6 months, or sooner if necessary.\par Labs prior to next visit. \par

## 2022-11-11 NOTE — PROGRESS NOTE ADULT - PROBLEM SELECTOR PROBLEM 1
Psychiatric Follow Up Progress Note     Patient: Yajaira Edmond Date: 2022   : 1990    32 year old female      This visit was performed:  [] In person at [facility], [place], [place]  [] Via live interactive two-way video with patient located in [place] and provider located at [facility], [place], [place].    The treatment advice provided was based on what was reported by the patient. Without the patient being seen and evaluated in person, there exists some risk that the information and/or assessment provided by the [facility] provider might be incomplete or inaccurate.   [] Via telephone-only.  The treatment advice provided was based on what was reported by the patient. Without the patient being seen and evaluated in person, there exists some risk that the information and/or assessment provided by the [facility] provider might be incomplete or inaccurate.    Initial psychiatric evaluation:  2022  Previous psychiatric med trials:   · Luvox - heart burn in higher doses  · escitalopram     Significant Medical History:  na     Current Psychiatric Providers:  No longer seeing Luis Junior LPC (stopped 3/2022    At last appointment on 2022: increased buspirone and magnesium    Chief complaint: \"increased hyperfocus, feeling like something bad is going to happen.\"  Reports moved into new home;      Interval History: Yajaira is a 32 year old White  female with diagnoses of OCD and WILMAN who presents today reporting symptoms of obsessive thoughts.   Patient is currently on   • Buspirone 10mg am, 20mg hs - felt emotionless and numb  • Magnesium 300-400mg hs  • Fluoxetine 40mg daily    Current medication/s- Patient is taking as prescribed. Medication/s are not as effective. Side effects: none. Missed doses: none. AIMS: none.    Psychiatric Review of Symptoms:  Current symptoms have been gradually worsening for the last month   Current mood: \"good.\"    Appetite: \"fine.\"    Energy:  \"tired.\"  Concentration: \"okay.\" Motivation is \"okay.\"   Denies anhedonia.   Sleep:  Obtains  hours per night with fragmented sleep/middle insomnia. Very busy brain with the awakening  Denies suicidal ideation, plan, or intent.    Denies homicidal ideation, plan, or intent..    ROS: Endorses heartburn is not a problem.  Denies fever, malaise, rash, diarrhea/constipation/nausea, joint or muscle pain, headaches, chest pain or SOB.    EXAM:   Vitals: There were no vitals taken for this visit.               Mental Status Exam  Appearance:  Well-groomed, appears stated age, within camera view  Behavior:  Calm, pleasant, interactive.   Cooperativity:  Cooperative, forthcoming, appears reliable.    Speech:  Normal rate, tone and volume.   Language:  No abnormality noted.    Mood:  Noted in History of Present Illness.  Affect:  Mood-congruent, stable, normal range.  Thought Process:  Linear, logical, goal-directed.    Thought Content:  No overt delusions or abnormality noted.    Perception:  No hallucinations, not responding to internal stimuli.   Consciousness:  Awake and alert.   Orientation:  Oriented to person, place and time.   Musculoskeletal: No abnormal or involuntary muscle movements observed.  Memory:  No apparent impairments in short-term recall, no apparent impairments in long-term recall  Attention:  Good, no fluctuation or obvious deficit noted and able to follow the conversation.   Fund of Knowledge:  Consistent with education and experiences as evidenced by vocabulary.   Insight:  Good, based on appropriate recognition of impact of psychiatric symptoms and need for treatment.   Judgment:  Good, based on recent decisions.  Safety:  Noted in History of Present Illness.  Motivation to pursue treatment:  Good.    Suicide Risk Assessment:  Risk Factors: mood disorder, no children under 18 in the home and current stressor.  Protective Factors/Strengths: future-oriented and goal planning, frustration tolerance,  absence of psychosis, good social supports, able to make needs known, treatment compliance and reasons for living.  Risk Level: low.  Safety Plan:  has crisis resource information and safe people to whom he/she can reach out       Assessment: Yajaira presents for management of diagnoses listed below.  She reports increased obsessive thoughts, inability to block from her mind... 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  Sleep is difficult, has a hard time shutting down her brain to sleep;  Not a worried brain, just very busy.  Did not like larger hs dose of buspirone - made her feel numb and emotionless    Diagnoses:   · Obsessive-Compulsive Disorder (F42.9)   · Generalized Anxiety Disorder (F41.1)     Medication Options:    • Fluoxetine increase  • Add trazodone  • Reduce buspirone to 10mg twice dialy    Medication Plan B:    Medical Decision Making and Plan of Treatment: increase fluoxetine to 80mg  Add trazodone 25mg   Reduce buspirone to 10mg twice dialy  Follow up in 5 weeks    Orders Placed:    Fluoxetine 40mg, two capsules daily #60 R2  Trazodone 50mg, half tablet at hs, #30 R2  Buspirone 10mg twice daily #60 R2      Interventions:    Reviewed data collected during assessment process.    Discussed recommended medications and alternative options, risks, benefits and side effects.   Discussed adjunct /alternative treatments, including the role of psychotherapy in treating mental health illnesses.    Discussed collaborative treatment plan.     Praised patient's effort to seek help, extended hope for management and control of symptoms.      Follow-Up:  Patient will follow up with writer 5 weeks.  Encouraged to call the office with any questions, comments, concerns or to reschedule an earlier appointment with writer.    Prep time: 10 min  Appointment time:  20 min  Documentation time: 10 min  Total time spent:  40 min    This  S/P CABG x 3 information is confidential and disclosure without patient consent or statutory authorization is prohibited by law.    Sherry Dior RN, MSN, APNP, PMHNP-BC

## 2022-12-19 NOTE — PATIENT PROFILE ADULT. - MEDICATIONS BROUGHT TO HOSPITAL, PROFILE
Sumatriptan- refilled per protocol    Zofran- There is currently no refill protocol for this medication, I am unable to refill. Routed to Provider to review and sign if appropriate.     Next Appointment:  No future appointment   Last seen:  9/30/22  Last refill:  ondansetron (ZOFRAN) 4 MG tablet 12 tablet 5 9/30/2022     Sig - Route: Take 1 tablet by mouth every 8 hours as needed for Nausea. - Oral    Sent to pharmacy as: Ondansetron HCl 4 MG Oral Tablet (ZOFRAN)          Controlled medication, routed to PCP to address.     
no

## 2023-01-01 NOTE — DISCHARGE NOTE ADULT - CARE PLAN
Principal Discharge DX:	S/P CABG x 3  Goal:	Recovery  Assessment and plan of treatment:	Resume activity as tolerated   Shower daily and wash all incisions with soap and water  Ambulate  at least four times daily  Record daily weigh and report changes in weight greater than 3 lbs  Resume low cholesterol low sodium diet  Use incentive spirometry every hour during the day  Please follow up with Dr Cantu 2/26/17 @1:00pm Call our office if you need to be seen sooner.  Please follow up with your cardiologist; Dr Lisker in 2 weeks on the discharge service for the patient. I have reviewed and made amendments to the documentation where necessary.

## 2023-02-21 ENCOUNTER — APPOINTMENT (OUTPATIENT)
Dept: CARDIOLOGY | Facility: CLINIC | Age: 58
End: 2023-02-21

## 2023-03-09 ENCOUNTER — TRANSCRIPTION ENCOUNTER (OUTPATIENT)
Age: 58
End: 2023-03-09

## 2023-03-09 LAB
ALBUMIN SERPL ELPH-MCNC: 4.5 G/DL
ALP BLD-CCNC: 75 U/L
ALT SERPL-CCNC: 17 U/L
ANION GAP SERPL CALC-SCNC: 13 MMOL/L
AST SERPL-CCNC: 12 U/L
BILIRUB SERPL-MCNC: 0.4 MG/DL
BUN SERPL-MCNC: 18 MG/DL
CALCIUM SERPL-MCNC: 9.9 MG/DL
CHLORIDE SERPL-SCNC: 102 MMOL/L
CHOLEST SERPL-MCNC: 113 MG/DL
CO2 SERPL-SCNC: 24 MMOL/L
CREAT SERPL-MCNC: 1.18 MG/DL
EGFR: 71 ML/MIN/1.73M2
GLUCOSE SERPL-MCNC: 85 MG/DL
HDLC SERPL-MCNC: 40 MG/DL
LDLC SERPL CALC-MCNC: 55 MG/DL
NONHDLC SERPL-MCNC: 73 MG/DL
POTASSIUM SERPL-SCNC: 4.7 MMOL/L
PROT SERPL-MCNC: 6.8 G/DL
SODIUM SERPL-SCNC: 138 MMOL/L
TRIGL SERPL-MCNC: 91 MG/DL

## 2023-03-21 ENCOUNTER — NON-APPOINTMENT (OUTPATIENT)
Age: 58
End: 2023-03-21

## 2023-03-21 ENCOUNTER — APPOINTMENT (OUTPATIENT)
Dept: CARDIOLOGY | Facility: CLINIC | Age: 58
End: 2023-03-21
Payer: COMMERCIAL

## 2023-03-21 VITALS
OXYGEN SATURATION: 100 % | HEIGHT: 74 IN | SYSTOLIC BLOOD PRESSURE: 130 MMHG | WEIGHT: 205 LBS | HEART RATE: 53 BPM | DIASTOLIC BLOOD PRESSURE: 90 MMHG | BODY MASS INDEX: 26.31 KG/M2

## 2023-03-21 PROCEDURE — 99213 OFFICE O/P EST LOW 20 MIN: CPT | Mod: 25

## 2023-03-21 PROCEDURE — 93000 ELECTROCARDIOGRAM COMPLETE: CPT

## 2023-03-21 NOTE — HISTORY OF PRESENT ILLNESS
[FreeTextEntry1] : Has been feeling okay. Recent corned beef use.\par LDL 55\par Planning to see a knee specialist.\par \par Prior:\par LDL 52. \par A1c is 5.9.\par He has been going to the gym, 30 min aerobic activity. Working out more.\par No bleeding.\par No DAVIS or chest pains.\par Wakes up with numbness in his fingers.\par \par Prior:\par Feels well.\par Routine readmill use. 4.2 mph 4% for 30 min.\par No exertional dyspnea or chest pain.\par No changes to meds.\par \par \par Prior:\par Today he is feeling well. He continues to exercise regularly by brisk walks, occasional biking and will be rejoining his gym soon. He denies any chest pain or exertional shortness of breath. His medications were reviewed and he is taking all as directed.\par \par Prior:\par He feels fine.\par Still exercising but less during COVID. No angina.\par No DAVIS.\par \par Prior:\par He continues to exercise routinely. 4.2 mph at an incline of 7 and up. for 30 min. No chest pain or dyspnea.\par \par s/p CABG x 3 with LIMA on 1/31/2018. Postoperative pneumomediastinum.

## 2023-03-21 NOTE — DISCUSSION/SUMMARY
[FreeTextEntry1] : He is a 58 year old with CAD s/p 3 vessel CABG in 2017, doing well, Normal EF.\par CAD: No angina. He will continue metoprolol succinate 25 mg daily for secondary prevention.\par CAD- LDL at goal and daily aspirin and statin therapy for cardiovascular and stroke risk reduction\par Encouraged return to vigorous activity.\par BP: borderline, suggested reduced salt intake.\par Follow up in 6 months, or sooner if necessary.\par Labs prior to next visit. \par   [EKG obtained to assist in diagnosis and management of assessed problem(s)] : EKG obtained to assist in diagnosis and management of assessed problem(s)

## 2023-04-12 ENCOUNTER — APPOINTMENT (OUTPATIENT)
Dept: INTERNAL MEDICINE | Facility: CLINIC | Age: 58
End: 2023-04-12
Payer: COMMERCIAL

## 2023-04-12 VITALS
TEMPERATURE: 97.6 F | DIASTOLIC BLOOD PRESSURE: 80 MMHG | HEART RATE: 80 BPM | SYSTOLIC BLOOD PRESSURE: 120 MMHG | WEIGHT: 204 LBS | HEIGHT: 74 IN | OXYGEN SATURATION: 97 % | BODY MASS INDEX: 26.18 KG/M2

## 2023-04-12 DIAGNOSIS — Z00.00 ENCOUNTER FOR GENERAL ADULT MEDICAL EXAMINATION W/OUT ABNORMAL FINDINGS: ICD-10-CM

## 2023-04-12 DIAGNOSIS — Z87.891 PERSONAL HISTORY OF NICOTINE DEPENDENCE: ICD-10-CM

## 2023-04-12 DIAGNOSIS — G47.33 OBSTRUCTIVE SLEEP APNEA (ADULT) (PEDIATRIC): ICD-10-CM

## 2023-04-12 DIAGNOSIS — K21.9 GASTRO-ESOPHAGEAL REFLUX DISEASE W/OUT ESOPHAGITIS: ICD-10-CM

## 2023-04-12 PROCEDURE — 36415 COLL VENOUS BLD VENIPUNCTURE: CPT

## 2023-04-12 PROCEDURE — G0444 DEPRESSION SCREEN ANNUAL: CPT | Mod: 59

## 2023-04-12 PROCEDURE — 99386 PREV VISIT NEW AGE 40-64: CPT | Mod: 25

## 2023-04-12 NOTE — HISTORY OF PRESENT ILLNESS
[FreeTextEntry1] : Establish Care, CPE [de-identified] : Mr. GUTIERREZ is a 58 year old male that presents to the office as a new patient for a CPE. The patient has a history of CAD, HLD, GERD, CABG x 3, colonic polyps, umbilical hernia repair, FH of colon cancer and LOAN (not on CPAP). The patient feels well at this time and has no complaints. He follows regularly with urology for his prostate exams and cardiology for surveillance of CAD and HTN. He is compliant with his medication regimen and reports no adverse effects. He started CRC screening at age 39 due to a family history (Father) of colon cancer.

## 2023-04-12 NOTE — HEALTH RISK ASSESSMENT
[Yes] : Yes [2 - 4 times a month (2 pts)] : 2-4 times a month (2 points) [1 or 2 (0 pts)] : 1 or 2 (0 points) [Never (0 pts)] : Never (0 points) [0] : 2) Feeling down, depressed, or hopeless: Not at all (0) [PHQ-2 Negative - No further assessment needed] : PHQ-2 Negative - No further assessment needed [None] : None [Alone] : lives alone [Employed] : employed [Retired] : retired [College] : College [Single] : single [Sexually Active] : sexually active [Feels Safe at Home] : Feels safe at home [> 15 Years] : > 15 Years [de-identified] : occasional social use [de-identified] : Treadmill 5x/week [de-identified] : Regular, focusing on health eating [High Risk Behavior] : no high risk behavior [Reports changes in hearing] : Reports no changes in hearing [Reports changes in vision] : Reports no changes in vision [Reports changes in dental health] : Reports no changes in dental health [ColonoscopyDate] : Jan 2022 [FreeTextEntry2] : Teacher,  [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

## 2023-04-20 ENCOUNTER — APPOINTMENT (OUTPATIENT)
Dept: THORACIC SURGERY | Facility: CLINIC | Age: 58
End: 2023-04-20
Payer: COMMERCIAL

## 2023-04-20 ENCOUNTER — NON-APPOINTMENT (OUTPATIENT)
Age: 58
End: 2023-04-20

## 2023-04-20 VITALS — BODY MASS INDEX: 32.73 KG/M2 | HEIGHT: 74 IN | WEIGHT: 255 LBS

## 2023-04-20 DIAGNOSIS — Z87.891 PERSONAL HISTORY OF NICOTINE DEPENDENCE: ICD-10-CM

## 2023-04-20 PROCEDURE — G0296 VISIT TO DETERM LDCT ELIG: CPT | Mod: 95

## 2023-04-20 NOTE — H&P PST ADULT - WEIGHT IN KG
104.2 Dapsone Counseling: I discussed with the patient the risks of dapsone including but not limited to hemolytic anemia, agranulocytosis, rashes, methemoglobinemia, kidney failure, peripheral neuropathy, headaches, GI upset, and liver toxicity.  Patients who start dapsone require monitoring including baseline LFTs and weekly CBCs for the first month, then every month thereafter.  The patient verbalized understanding of the proper use and possible adverse effects of dapsone.  All of the patient's questions and concerns were addressed.

## 2023-04-22 ENCOUNTER — APPOINTMENT (OUTPATIENT)
Dept: CT IMAGING | Facility: CLINIC | Age: 58
End: 2023-04-22
Payer: COMMERCIAL

## 2023-04-22 ENCOUNTER — OUTPATIENT (OUTPATIENT)
Dept: OUTPATIENT SERVICES | Facility: HOSPITAL | Age: 58
LOS: 1 days | End: 2023-04-22
Payer: COMMERCIAL

## 2023-04-22 DIAGNOSIS — Z87.891 PERSONAL HISTORY OF NICOTINE DEPENDENCE: ICD-10-CM

## 2023-04-22 DIAGNOSIS — Z98.890 OTHER SPECIFIED POSTPROCEDURAL STATES: Chronic | ICD-10-CM

## 2023-04-22 DIAGNOSIS — Z87.39 PERSONAL HISTORY OF OTHER DISEASES OF THE MUSCULOSKELETAL SYSTEM AND CONNECTIVE TISSUE: Chronic | ICD-10-CM

## 2023-04-22 PROCEDURE — 71271 CT THORAX LUNG CANCER SCR C-: CPT | Mod: 26

## 2023-04-22 PROCEDURE — 71271 CT THORAX LUNG CANCER SCR C-: CPT

## 2023-04-26 ENCOUNTER — NON-APPOINTMENT (OUTPATIENT)
Age: 58
End: 2023-04-26

## 2023-04-26 PROBLEM — Z87.891 FORMER SMOKER: Status: ACTIVE | Noted: 2018-03-14

## 2023-04-26 NOTE — HISTORY OF PRESENT ILLNESS
[TextBox_13] : Referred by Dr. Braxton\par \par Mr. SATURNINO GUTIERREZ is a 58 year old man with a history of nicotine dependence\par \par  Over the telephone today we reviewed and confirmed that the patient meets screening eligibility criteria:\par \par -Age: 58 years old\par \par Smoking status:\par \par -Former smoker\par \par -Number of pack(s) per day: 1\par \par -Number of years smoked: 25\par \par -Number of pack years smokin\par \par -Number of years since quitting smokin\par \par -Quit year:\par \par \par Mr. GUTIERREZ denies any personal history of lung cancer. Denies any s/s of lung cancer. No lung cancer in a 1st degree relative. Denies any history of lung disease. Denies any history of occupational exposures\par  [YearQuit] : 2018 [PacksperDay] : 1 [N_Years] : 25 [PacksperYear] : 25

## 2023-05-12 LAB
APPEARANCE: CLEAR
BACTERIA: NEGATIVE
BASOPHILS # BLD AUTO: 0.07 K/UL
BASOPHILS NFR BLD AUTO: 1.3 %
BILIRUBIN URINE: NEGATIVE
BLOOD URINE: NEGATIVE
C TETANI IGG SER-ACNC: 0.85 IU/ML
COLOR: YELLOW
EOSINOPHIL # BLD AUTO: 0.11 K/UL
EOSINOPHIL NFR BLD AUTO: 2 %
ESTIMATED AVERAGE GLUCOSE: 120 MG/DL
GLUCOSE QUALITATIVE U: NEGATIVE
HBA1C MFR BLD HPLC: 5.8 %
HCT VFR BLD CALC: 48.3 %
HGB BLD-MCNC: 15.1 G/DL
HYALINE CASTS: 0 /LPF
IMM GRANULOCYTES NFR BLD AUTO: 0.2 %
KETONES URINE: NEGATIVE
LEUKOCYTE ESTERASE URINE: NEGATIVE
LYMPHOCYTES # BLD AUTO: 1.74 K/UL
LYMPHOCYTES NFR BLD AUTO: 32.3 %
MAN DIFF?: NORMAL
MCHC RBC-ENTMCNC: 30.6 PG
MCHC RBC-ENTMCNC: 31.3 GM/DL
MCV RBC AUTO: 98 FL
MEV IGG FLD QL IA: 17.9 AU/ML
MEV IGG+IGM SER-IMP: POSITIVE
MICROSCOPIC-UA: NORMAL
MONOCYTES # BLD AUTO: 0.46 K/UL
MONOCYTES NFR BLD AUTO: 8.6 %
MUV AB SER-ACNC: POSITIVE
MUV IGG SER QL IA: 19.7 AU/ML
NEUTROPHILS # BLD AUTO: 2.99 K/UL
NEUTROPHILS NFR BLD AUTO: 55.6 %
NITRITE URINE: NEGATIVE
PH URINE: 6
PLATELET # BLD AUTO: 212 K/UL
PROTEIN URINE: NORMAL
PSA FREE FLD-MCNC: 29 %
PSA FREE SERPL-MCNC: 0.36 NG/ML
PSA SERPL-MCNC: 1.25 NG/ML
RBC # BLD: 4.93 M/UL
RBC # FLD: 12.5 %
RED BLOOD CELLS URINE: 2 /HPF
RUBV IGG FLD-ACNC: 10.7 INDEX
RUBV IGG SER-IMP: POSITIVE
SPECIFIC GRAVITY URINE: 1.02
SQUAMOUS EPITHELIAL CELLS: 0 /HPF
T4 SERPL-MCNC: 6.4 UG/DL
TSH SERPL-ACNC: 1.89 UIU/ML
UROBILINOGEN URINE: NORMAL
WBC # FLD AUTO: 5.38 K/UL
WHITE BLOOD CELLS URINE: 0 /HPF

## 2023-05-27 ENCOUNTER — NON-APPOINTMENT (OUTPATIENT)
Age: 58
End: 2023-05-27

## 2023-06-12 NOTE — PROGRESS NOTE ADULT - PROVIDER SPECIALTY LIST ADULT
CT Surgery Charu Vásquez 79 y o  female MRN: [id number]    Encounter: [id number]      Assessment/Plan     Assessment: This is a 79y o -year-old female with diabetes with hyperglycemia  Plan:      Diagnoses and all orders for this visit:    Type 2 diabetes mellitus with hyperglycemia, with long-term current use of insulin ([facility] )    Lab Results   Component Value Date    HGBA1C 6 8 (A) 05/04/2023   · A1c 6 8%  · A1c is tightly controlled for her age, also given patient is having frequent hypoglycemic episodes during the day as well as early in the morning we will reduce Toujeo to 25 units at bedtime, reduce NovoLog to 6 units subcutaneously before breakfast, lunch and then before dinner plus sliding scale  · Educated about hypoglycemia symptoms and treatment  · Discussed the importance of use of continuous glucose monitor sensor regularly  · Discussed to keep carbohydrate consistent with meals, hold short acting insulin if not eating or eating small meal            -     insulin glargine (Toujeo SoloStar) 300 units/mL CONCENTRATED U-300 injection pen (1-unit dial); Inject 25  units at bedtime  -     Basic metabolic panel; Future  -     Hemoglobin A1C; Future  -     POCT blood glucose  -     POCT blood glucose  -     POCT blood glucose    Acquired hypothyroidism  Lab Results   Component Value Date    [id number] 14 246 (H) 06/09/2023   · TSH is 14  · We will change levothyroxine to 125 mcg daily from Monday through Saturday and take half tablet on Sunday  · TSH and free T4 before next appointment  · Educated to take levothyroxine on empty stomach 1 hour before breakfast    Primary hypertension  Blood pressure, well controlled  Continue current management  Stage 3a chronic kidney disease ([facility] )  Lab Results   Component Value Date    CREATININE 1 13 03/01/2023   Creatinine is stable    Continue to monitor    Morbid obesity with body mass index (BMI) of 40 0 to 44 9 in [facility])  Educated about keeping carbohydrate consistent with meals, importance of weight loss  Type 2 diabetes mellitus with other circulatory complication, with long-term current use of insulin ([facility] )  She is followed by cardiology  -     insulin aspart (NovoLOG FlexPen) 100 UNIT/ML injection pen; INJECT 6 UNITS SUBCUTANEOUSLY WITH BREAKFAST, THEN 6 UNITS WITH LUNCH, AND THEN 6 UNITS WITH DINNER    Hypothyroidism, unspecified type  Adjustment has been made to levothyroxine dose  -     levothyroxine 125 mcg tablet; Take 1 tablet from mon to sat and 1/2 tablet on Sunday  -     T4, free; Future  -     TSH, 3rd generation; Future      CC: Diabetes    History of Present Illness     HPI:    Burgess Vaz is 79-year-old woman with medical history of type 2 diabetes with long-term insulin use is here for follow-up  Complications of diabetes are coronary artery disease CKD retinopathy  SHe denies any history of hospitalization for hyperglycemia or hypoglycemia  She checks blood sugars regularly with continuous glucose monitor sensor by [employer]  She is having multiple episodes of hypoglycemia during the day  She uses continuous glucose monitor sensor by [employer]  93% time CGM is active  2 weeks overview from 838 to June 12, 2023 reviewed  78% blood sugars are in target range, 10% blood sugars are low, 12% blood sugars are about the target  Average glucose 123 mg per DL, GMI 6 3% patient has a pattern of low blood sugars from 3 AM to 3 PM intermittently, especially after meals, taking short acting insulin  Also she has few low blood sugars early in the morning  Current regimen is Toujeo 36 units at bedtime, NovoLog 8 units with breakfast 10 units with lunch and dinner plus sliding scale  Blood pressure is well controlled on metoprolol 25 mg twice a day, spironolactone 25 mg daily, lisinopril 20 mg daily    For hyperlipidemia she takes fish oil 1200 mg twice a day repeat or 80 mg daily        Lab Results   Component Value Date    [id number] 14 646 (H) 06/09/2023       Lab Results   Component Value Date    CREATININE 1 13 03/01/2023       Lab Results   Component Value Date    HGBA1C 6 8 (A) 05/04/2023          Review of Systems   Constitutional: Negative for activity change, diaphoresis, fatigue, fever and unexpected weight change  HENT: Negative  Eyes: Negative for visual disturbance  Respiratory: Negative for cough, chest tightness and shortness of breath  Cardiovascular: Negative for chest pain, palpitations and leg swelling  Gastrointestinal: Negative for abdominal pain, blood in stool, constipation, diarrhea, nausea and vomiting  Endocrine: Negative for cold intolerance, heat intolerance, polydipsia, polyphagia and polyuria  Genitourinary: Negative for dysuria, enuresis, frequency and urgency  Musculoskeletal: Negative for arthralgias and myalgias  Skin: Negative for pallor, rash and wound  Allergic/Immunologic: Negative  Neurological: Negative for dizziness, tremors, weakness and numbness  Hematological: Negative  Psychiatric/Behavioral: Negative  Historical Information   Past Medical History:   Diagnosis Date   • Anxiety    • Arthritis    • CHF (congestive heart failure) ([place] [address] )    • Chronic venous insufficiency    • COPD (chronic obstructive pulmonary disease) ([facility])    • Coronary artery disease    • Diabetes mellitus ([place] [address] )     type II   • Disease of thyroid gland     hypothyroidism   • DVT (deep venous thrombosis) ([facility])    • HL (hearing loss)    • Hyperlipidemia    • Hypertension    • Irregular heart beat     Paroxysmal a-fib   • Pacemaker    • Renal disorder     stage 3   • Sleep apnea    • Sleep difficulties    • Thyroid disease    • Urinary incontinence    • Uterine leiomyoma    • Walker as ambulation aid      Past Surgical History:   Procedure Laterality Date   • BREAST SURGERY      For Biopsy   • CARDIAC PACEMAKER PLACEMENT      Permanent   Last assessed: 2/19/16   • CARDIAC SURGERY     • CARPAL TUNNEL RELEASE Bilateral    • CATARACT EXTRACTION     • CATARACT EXTRACTION W/ INTRAOCULAR LENS  IMPLANT, BILATERAL     • CYSTOSCOPY      Diagnostic  Last assessed: 11/8/17   • ECTOPIC PREGNANCY SURGERY     • HYSTERECTOMY      Total Abdomonal  Resolved: 1982   • JOINT REPLACEMENT Right     TKR   • OOPHORECTOMY     • MD ENDOVEN ABLTJ INCMPTNT VEIN XTR LASER 1ST VEIN Left 2/17/2017    Procedure: GREATER SAPHENOUS VEIN ENDOVASCULAR LASER THERAPY ;  Surgeon: Arcenio Cabrera DO;  Location: [facility];  Service: Vascular   • TONSILLECTOMY       Social History   Social History     Substance and Sexual Activity   Alcohol Use Yes    Comment: rarely   Per [employer]: Denied history of alcohol use     Social History     Substance and Sexual Activity   Drug Use No     Social History     Tobacco Use   Smoking Status Never   Smokeless Tobacco Never   Tobacco Comments    Per [employer]: Former smoker     Family History:   Family History   Problem Relation Age of Onset   • Coronary artery disease Mother    • Hypertension Mother    • Heart disease Mother    • Hypertension Father    • Diabetes Father         Mellitus   • No Known Problems Sister    • No Known Problems Daughter    • No Known Problems Daughter    • No Known Problems Maternal Grandmother    • No Known Problems Maternal Grandfather    • No Known Problems Paternal Grandmother    • No Known Problems Paternal Grandfather    • Diabetes Brother         Mellitus   • No Known Problems Son    • Breast cancer Maternal Aunt    • Pancreatic cancer Maternal Aunt    • Breast cancer Cousin    • Breast cancer Cousin    • Breast cancer Paternal Aunt    • No Known Problems Paternal Aunt    • No Known Problems Paternal Aunt        Meds/Allergies   Current Outpatient Medications   Medication Sig Dispense Refill   • acetaminophen (TYLENOL) 325 mg tablet Take 650 mg by mouth     • apixaban (Eliquis) 5 mg Take 1 tablet (5 mg total) by mouth 2 (two) times a day 56 tablet 0   • Ascorbic Acid (VITAMIN C) 1000 MG tablet Take 500 mg by mouth      • atorvastatin (LIPITOR) 80 mg tablet Take 1 tablet (80 mg total) by mouth daily at bedtime 90 tablet 3   • Calcium Carbonate-Vit D-Min (CALCIUM 1200 PO) Take by mouth     • citalopram (CeleXA) 20 mg tablet TAKE 1 TABLET EVERY DAY 90 tablet 3   • Continuous Blood Gluc  (FREESTYLE RONNI 14 DAY READER) GARRY Use to check BG levels 4+times daily and as needed 1 Device 0   • Continuous Blood Gluc Sensor (FREESTYLE RONNI 14 DAY SENSOR) MISC Change sensor every 14 days to check BG levels 4+ times daily 6 each 3   • GLUCOSAMINE CHONDROITIN COMPLX PO Take 1 tablet by mouth 2 (two) times a day     • insulin aspart (NovoLOG FlexPen) 100 UNIT/ML injection pen INJECT 6 UNITS SUBCUTANEOUSLY WITH BREAKFAST, THEN 6 UNITS WITH LUNCH, AND THEN 6 UNITS WITH DINNER 15 mL 1   • insulin glargine (Toujeo SoloStar) 300 units/mL CONCENTRATED U-300 injection pen (1-unit dial) Inject 25  units at bedtime 12 mL 1   • Insulin Pen Needle (BD Pen Needle Sasha U/F) 32G X 4 MM MISC Inject 4 times a day 400 each 1   • Lancets (onetouch ultrasoft) lancets Use 4 times a day 400 each 0   • levothyroxine 125 mcg tablet Take 1 tablet from mon to sat and 1/2 tablet on Sunday 90 tablet 1   • lisinopril (ZESTRIL) 20 mg tablet Take 1 tablet (20 mg total) by mouth 2 (two) times a day 180 tablet 3   • metoprolol tartrate (LOPRESSOR) 25 mg tablet TAKE 1 TABLET EVERY 12 HOURS 180 tablet 3   • Omega-3 Fatty Acids (FISH OIL) 1200 MG CAPS Take 1,200 mg by mouth 2 (two) times a day      • omeprazole (PriLOSEC) 20 mg delayed release capsule TAKE 1 CAPSULE TWICE DAILY 180 capsule 3   • oxybutynin (DITROPAN) 5 mg tablet Take 1 tablet (5 mg total) by mouth 2 (two) times a day 60 tablet 6   • Promethazine-DM (PHENERGAN-DM) 6 25-15 mg/5 mL oral syrup Take 5 mL by mouth 4 (four) times a day as needed for cough 150 mL 0   • spironolactone (ALDACTONE) 25 mg tablet Take 1 tablet (25 mg total) by mouth daily 90 tablet 3   • "Tetrahydroz-Polyvinyl Al-Povid (MURINE TEARS PLUS OP) Apply to eye 3 (three) times a day     • glucose blood (TRUE METRIX BLOOD GLUCOSE TEST) test strip Test 4 times plus daily, as instructed (Patient not taking: Reported on 6/12/2023) 400 each 1   • triamcinolone (KENALOG) 0 1 % cream Apply topically 2 (two) times a day (Patient not taking: Reported on 6/12/2023) 160 g 1     No current facility-administered medications for this visit  Allergies   Allergen Reactions   • Ampicillin Hives       Objective   Vitals: Blood pressure 128/60, pulse 60, height 5' 1\" (1 549 m), weight 96 9 kg (213 lb 9 6 oz), SpO2 92 %, not currently breastfeeding  Physical Exam  Vitals reviewed  Constitutional:       General: She is not in acute distress  Appearance: Normal appearance  She is not ill-appearing  HENT:      Head: Normocephalic and atraumatic  Nose: Nose normal    Eyes:      Extraocular Movements: Extraocular movements intact  Conjunctiva/sclera: Conjunctivae normal    Cardiovascular:      Rate and Rhythm: Normal rate and regular rhythm  Pulses: Normal pulses  Heart sounds: Normal heart sounds  Pulmonary:      Effort: No respiratory distress  Musculoskeletal:         General: Normal range of motion  Cervical back: Normal range of motion and neck supple  Right lower leg: No edema  Left lower leg: No edema  Skin:     General: Skin is warm and dry  Findings: No rash  Neurological:      General: No focal deficit present  Mental Status: She is alert and oriented to person, place, and time  Psychiatric:         Mood and Affect: Mood normal          Behavior: Behavior normal          The history was obtained from the review of the chart, patient      Lab Results:   Lab Results   Component Value Date/Time    Albumin 3 4 (L) 03/01/2023 11:32 AM    Albumin 3 2 (L) 09/14/2022 12:57 PM    Albumin 3 9 09/02/2022 09:20 AM    ALT 19 03/01/2023 11:32 AM    ALT 26 09/14/2022 " "12:57 PM    ALT 13 09/02/2022 09:20 AM    AST 17 03/01/2023 11:32 AM    AST 18 09/14/2022 12:57 PM    AST 16 09/02/2022 09:20 AM    BUN 32 (H) 03/01/2023 11:32 AM    BUN 26 (H) 02/15/2023 09:28 AM    BUN 28 (H) 10/11/2022 09:31 AM    Chloride 106 03/01/2023 11:32 AM    Chloride 106 02/15/2023 09:28 AM    Chloride 107 10/11/2022 09:31 AM    CO2 31 03/01/2023 11:32 AM    CO2 32 02/15/2023 09:28 AM    CO2 30 10/11/2022 09:31 AM    Creatinine 1 13 03/01/2023 11:32 AM    Creatinine 0 99 02/15/2023 09:28 AM    Creatinine 1 19 10/11/2022 09:31 AM    Hematocrit 41 3 03/01/2023 11:32 AM    Hematocrit 38 4 02/15/2023 09:28 AM    Hematocrit 39 9 09/14/2022 12:57 PM    HDL, Direct 63 03/01/2023 11:32 AM    Hemoglobin 12 5 03/01/2023 11:32 AM    Hemoglobin 11 6 02/15/2023 09:28 AM    Hemoglobin 12 0 09/14/2022 12:57 PM    Hemoglobin A1C 6 8 (A) 05/04/2023 04:02 PM    Hemoglobin A1C 6 9 (A) 02/02/2023 02:57 PM    Hemoglobin A1C 7 0 (H) 10/11/2022 09:31 AM    Hemoglobin A1C 6 6 (A) 06/29/2022 02:41 PM    Potassium 4 4 03/01/2023 11:32 AM    Potassium 4 2 02/15/2023 09:28 AM    Potassium 4 2 10/11/2022 09:31 AM     (H) 03/01/2023 11:32 AM     (H) 02/15/2023 09:28 AM     (H) 09/14/2022 12:57 PM    Platelets 103 04/25/0918 11:32 AM    Platelets 317 09/68/6400 09:28 AM    Platelets 517 (L) 02/24/2254 12:57 PM    Total Protein 6 9 03/01/2023 11:32 AM    Total Protein 6 8 09/14/2022 12:57 PM    Total Protein 6 8 09/02/2022 09:20 AM    Triglycerides 67 03/01/2023 11:32 AM    WBC 5 30 03/01/2023 11:32 AM    WBC 5 02 02/15/2023 09:28 AM    WBC 6 78 09/14/2022 12:57 PM           Imaging Studies: I have personally reviewed pertinent reports  Portions of the record may have been created with voice recognition software  Occasional wrong word or \"sound a like\" substitutions may have occurred due to the inherent limitations of voice recognition software   Read the chart carefully and recognize, using context, where " substitutions have occurred

## 2023-06-13 ENCOUNTER — APPOINTMENT (OUTPATIENT)
Dept: ORTHOPEDIC SURGERY | Facility: CLINIC | Age: 58
End: 2023-06-13

## 2023-10-24 LAB
ANION GAP SERPL CALC-SCNC: 12 MMOL/L
BUN SERPL-MCNC: 13 MG/DL
CALCIUM SERPL-MCNC: 9.9 MG/DL
CHLORIDE SERPL-SCNC: 105 MMOL/L
CHOLEST SERPL-MCNC: 128 MG/DL
CO2 SERPL-SCNC: 26 MMOL/L
CREAT SERPL-MCNC: 1.17 MG/DL
EGFR: 72 ML/MIN/1.73M2
GLUCOSE SERPL-MCNC: 103 MG/DL
HCT VFR BLD CALC: 46 %
HDLC SERPL-MCNC: 46 MG/DL
HGB BLD-MCNC: 15.3 G/DL
LDLC SERPL CALC-MCNC: 66 MG/DL
MCHC RBC-ENTMCNC: 30.6 PG
MCHC RBC-ENTMCNC: 33.3 GM/DL
MCV RBC AUTO: 92 FL
NONHDLC SERPL-MCNC: 81 MG/DL
PLATELET # BLD AUTO: 204 K/UL
POTASSIUM SERPL-SCNC: 5.3 MMOL/L
RBC # BLD: 5 M/UL
RBC # FLD: 12.3 %
SODIUM SERPL-SCNC: 142 MMOL/L
TRIGL SERPL-MCNC: 79 MG/DL
WBC # FLD AUTO: 5.99 K/UL

## 2023-10-26 ENCOUNTER — APPOINTMENT (OUTPATIENT)
Dept: ORTHOPEDIC SURGERY | Facility: CLINIC | Age: 58
End: 2023-10-26

## 2023-11-07 ENCOUNTER — APPOINTMENT (OUTPATIENT)
Dept: CARDIOLOGY | Facility: CLINIC | Age: 58
End: 2023-11-07
Payer: COMMERCIAL

## 2023-11-07 ENCOUNTER — NON-APPOINTMENT (OUTPATIENT)
Age: 58
End: 2023-11-07

## 2023-11-07 VITALS
BODY MASS INDEX: 26.96 KG/M2 | OXYGEN SATURATION: 98 % | DIASTOLIC BLOOD PRESSURE: 84 MMHG | SYSTOLIC BLOOD PRESSURE: 132 MMHG | WEIGHT: 210 LBS | HEART RATE: 69 BPM

## 2023-11-07 DIAGNOSIS — I70.90 UNSPECIFIED ATHEROSCLEROSIS: ICD-10-CM

## 2023-11-07 PROCEDURE — 99214 OFFICE O/P EST MOD 30 MIN: CPT | Mod: 25

## 2023-11-07 PROCEDURE — 93000 ELECTROCARDIOGRAM COMPLETE: CPT

## 2023-11-09 ENCOUNTER — APPOINTMENT (OUTPATIENT)
Dept: ORTHOPEDIC SURGERY | Facility: CLINIC | Age: 58
End: 2023-11-09

## 2023-11-28 ENCOUNTER — APPOINTMENT (OUTPATIENT)
Dept: ORTHOPEDIC SURGERY | Facility: CLINIC | Age: 58
End: 2023-11-28

## 2024-03-15 ENCOUNTER — RX RENEWAL (OUTPATIENT)
Age: 59
End: 2024-03-15

## 2024-03-15 RX ORDER — ROSUVASTATIN CALCIUM 20 MG/1
20 TABLET, FILM COATED ORAL
Qty: 90 | Refills: 3 | Status: ACTIVE | COMMUNITY
Start: 2018-07-25 | End: 1900-01-01

## 2024-03-15 RX ORDER — METOPROLOL SUCCINATE 25 MG/1
25 TABLET, EXTENDED RELEASE ORAL
Qty: 90 | Refills: 3 | Status: ACTIVE | COMMUNITY
Start: 2018-02-21 | End: 1900-01-01

## 2024-04-10 ENCOUNTER — APPOINTMENT (OUTPATIENT)
Dept: INTERNAL MEDICINE | Facility: CLINIC | Age: 59
End: 2024-04-10

## 2024-06-06 ENCOUNTER — APPOINTMENT (OUTPATIENT)
Dept: CARDIOLOGY | Facility: CLINIC | Age: 59
End: 2024-06-06

## 2024-06-19 ENCOUNTER — APPOINTMENT (OUTPATIENT)
Dept: INTERNAL MEDICINE | Facility: CLINIC | Age: 59
End: 2024-06-19
Payer: COMMERCIAL

## 2024-06-19 VITALS
TEMPERATURE: 97.6 F | OXYGEN SATURATION: 97 % | DIASTOLIC BLOOD PRESSURE: 80 MMHG | HEART RATE: 69 BPM | SYSTOLIC BLOOD PRESSURE: 118 MMHG | BODY MASS INDEX: 26.69 KG/M2 | WEIGHT: 208 LBS | HEIGHT: 74 IN

## 2024-06-19 VITALS — TEMPERATURE: 98.7 F

## 2024-06-19 DIAGNOSIS — L98.9 DISORDER OF THE SKIN AND SUBCUTANEOUS TISSUE, UNSPECIFIED: ICD-10-CM

## 2024-06-19 PROCEDURE — 99213 OFFICE O/P EST LOW 20 MIN: CPT

## 2024-06-22 PROBLEM — L98.9 SKIN LESION: Status: ACTIVE | Noted: 2024-06-22

## 2024-06-22 NOTE — HISTORY OF PRESENT ILLNESS
[FreeTextEntry8] : 59M PMH CAD s/p 3 vessel CABG in 2017, HLD, GERD, LOAN presents for concern for tick bite.  He reports that last week his girlfriend noticed a lesion on his left thigh which he thinks resembled a tick bite. He does not remember being bit or pulling off a tick. He has not been in tick infested areas. He does have bruises around the area of the lesion and attributed it to the housework he was doing. He denies any fevers, chills, myalgias, chest pain, shortness of breath. He denies any rashes around the area or itchiness. Of note, patient in on Augmentin for a dental implant procedure started yesterday by his dentist.

## 2024-06-22 NOTE — PHYSICAL EXAM
[Normal] : no acute distress, well nourished, well developed and well-appearing [de-identified] : small red lesion on his left thigh with a surrounding bruise, no bulls-eye rash noted

## 2024-06-22 NOTE — REVIEW OF SYSTEMS
[Skin Rash] : skin rash [Negative] : Constitutional [Itching] : no itching [de-identified] : small red cut on his left thigh

## 2024-06-22 NOTE — PLAN
[FreeTextEntry1] : 59M PMH CAD s/p 3 vessel CABG in 2017, HLD, GERD, LOAN presents for concern for tick bite.  # skin lesion - 5 days ago, noticed small lesion on his left thigh - no tick found or surrounding him  - on exam with small red lesion on his left thigh with a surrounding bruise, no bulls-eye rash noted - does not appear like a tick bite and not consistent with history and physical exam - advised likely from trauma while he was working at home - reassurance provided - use of bacitracin encouraged as needed

## 2024-06-26 LAB
ALBUMIN SERPL ELPH-MCNC: 4.2 G/DL
ALP BLD-CCNC: 90 U/L
ALT SERPL-CCNC: 21 U/L
ANION GAP SERPL CALC-SCNC: 10 MMOL/L
AST SERPL-CCNC: 14 U/L
BILIRUB SERPL-MCNC: 0.3 MG/DL
BUN SERPL-MCNC: 13 MG/DL
CALCIUM SERPL-MCNC: 9.5 MG/DL
CHLORIDE SERPL-SCNC: 106 MMOL/L
CHOLEST SERPL-MCNC: 121 MG/DL
CO2 SERPL-SCNC: 25 MMOL/L
CREAT SERPL-MCNC: 1.1 MG/DL
EGFR: 77 ML/MIN/1.73M2
GLUCOSE SERPL-MCNC: 109 MG/DL
HCT VFR BLD CALC: 42.9 %
HDLC SERPL-MCNC: 40 MG/DL
HGB BLD-MCNC: 14.2 G/DL
LDLC SERPL CALC-MCNC: 66 MG/DL
MCHC RBC-ENTMCNC: 30.6 PG
MCHC RBC-ENTMCNC: 33.1 GM/DL
MCV RBC AUTO: 92.5 FL
NONHDLC SERPL-MCNC: 81 MG/DL
PLATELET # BLD AUTO: 212 K/UL
POTASSIUM SERPL-SCNC: 5.5 MMOL/L
PROT SERPL-MCNC: 6.3 G/DL
RBC # BLD: 4.64 M/UL
RBC # FLD: 12.6 %
SODIUM SERPL-SCNC: 141 MMOL/L
TRIGL SERPL-MCNC: 73 MG/DL
WBC # FLD AUTO: 5.61 K/UL

## 2024-06-27 ENCOUNTER — NON-APPOINTMENT (OUTPATIENT)
Age: 59
End: 2024-06-27

## 2024-06-27 ENCOUNTER — APPOINTMENT (OUTPATIENT)
Dept: CARDIOLOGY | Facility: CLINIC | Age: 59
End: 2024-06-27
Payer: COMMERCIAL

## 2024-06-27 VITALS
HEART RATE: 56 BPM | DIASTOLIC BLOOD PRESSURE: 70 MMHG | WEIGHT: 209 LBS | BODY MASS INDEX: 26.82 KG/M2 | OXYGEN SATURATION: 99 % | SYSTOLIC BLOOD PRESSURE: 110 MMHG | HEIGHT: 74 IN

## 2024-06-27 DIAGNOSIS — I25.10 ATHEROSCLEROTIC HEART DISEASE OF NATIVE CORONARY ARTERY W/OUT ANGINA PECTORIS: ICD-10-CM

## 2024-06-27 DIAGNOSIS — Z95.1 PRESENCE OF AORTOCORONARY BYPASS GRAFT: ICD-10-CM

## 2024-06-27 PROCEDURE — 93000 ELECTROCARDIOGRAM COMPLETE: CPT

## 2024-06-27 PROCEDURE — G2211 COMPLEX E/M VISIT ADD ON: CPT | Mod: NC

## 2024-06-27 PROCEDURE — 99214 OFFICE O/P EST MOD 30 MIN: CPT | Mod: 25

## 2024-09-10 ENCOUNTER — APPOINTMENT (OUTPATIENT)
Dept: INTERNAL MEDICINE | Facility: CLINIC | Age: 59
End: 2024-09-10

## 2024-10-11 LAB
APPEARANCE: CLEAR
BACTERIA: NEGATIVE /HPF
BILIRUBIN URINE: NEGATIVE
BLOOD URINE: NEGATIVE
CAST: 0 /LPF
COLOR: YELLOW
EPITHELIAL CELLS: 0 /HPF
GLUCOSE QUALITATIVE U: NEGATIVE MG/DL
KETONES URINE: NEGATIVE MG/DL
LEUKOCYTE ESTERASE URINE: NEGATIVE
MICROSCOPIC-UA: NORMAL
NITRITE URINE: NEGATIVE
PH URINE: 6
PROTEIN URINE: NEGATIVE MG/DL
RED BLOOD CELLS URINE: 0 /HPF
SPECIFIC GRAVITY URINE: 1.01
UROBILINOGEN URINE: 0.2 MG/DL
WHITE BLOOD CELLS URINE: 0 /HPF

## 2024-10-15 ENCOUNTER — APPOINTMENT (OUTPATIENT)
Dept: INTERNAL MEDICINE | Facility: CLINIC | Age: 59
End: 2024-10-15
Payer: COMMERCIAL

## 2024-10-15 VITALS
DIASTOLIC BLOOD PRESSURE: 62 MMHG | SYSTOLIC BLOOD PRESSURE: 100 MMHG | TEMPERATURE: 98 F | BODY MASS INDEX: 27.3 KG/M2 | WEIGHT: 206 LBS | HEART RATE: 62 BPM | HEIGHT: 73 IN | OXYGEN SATURATION: 95 %

## 2024-10-15 DIAGNOSIS — Z87.891 PERSONAL HISTORY OF NICOTINE DEPENDENCE: ICD-10-CM

## 2024-10-15 DIAGNOSIS — I25.10 ATHEROSCLEROTIC HEART DISEASE OF NATIVE CORONARY ARTERY W/OUT ANGINA PECTORIS: ICD-10-CM

## 2024-10-15 DIAGNOSIS — Z00.00 ENCOUNTER FOR GENERAL ADULT MEDICAL EXAMINATION W/OUT ABNORMAL FINDINGS: ICD-10-CM

## 2024-10-15 DIAGNOSIS — K21.9 GASTRO-ESOPHAGEAL REFLUX DISEASE W/OUT ESOPHAGITIS: ICD-10-CM

## 2024-10-15 DIAGNOSIS — G47.33 OBSTRUCTIVE SLEEP APNEA (ADULT) (PEDIATRIC): ICD-10-CM

## 2024-10-15 PROCEDURE — 99396 PREV VISIT EST AGE 40-64: CPT

## 2024-10-21 NOTE — ED PROVIDER NOTE - CPE EDP HEME LYMPH NORM
The patient is of low risk for a surgical procedure that carries an inherent moderate risk of adverse cardiac events. In the absence of acute coronary syndrome, acutely decompensated heart failure, hemodynamically significant valvular disease, or malignant arrhythmia I would consider this risk acceptable to proceed without further cardiac workup or intervention.      normal...

## 2024-10-31 ENCOUNTER — NON-APPOINTMENT (OUTPATIENT)
Age: 59
End: 2024-10-31

## 2024-10-31 VITALS — WEIGHT: 206 LBS | BODY MASS INDEX: 27.3 KG/M2 | HEIGHT: 73 IN

## 2024-10-31 DIAGNOSIS — Z87.891 PERSONAL HISTORY OF NICOTINE DEPENDENCE: ICD-10-CM

## 2024-11-11 ENCOUNTER — APPOINTMENT (OUTPATIENT)
Dept: CT IMAGING | Facility: CLINIC | Age: 59
End: 2024-11-11
Payer: COMMERCIAL

## 2024-11-11 ENCOUNTER — OUTPATIENT (OUTPATIENT)
Dept: OUTPATIENT SERVICES | Facility: HOSPITAL | Age: 59
LOS: 1 days | End: 2024-11-11
Payer: COMMERCIAL

## 2024-11-11 DIAGNOSIS — Z98.890 OTHER SPECIFIED POSTPROCEDURAL STATES: Chronic | ICD-10-CM

## 2024-11-11 DIAGNOSIS — Z87.39 PERSONAL HISTORY OF OTHER DISEASES OF THE MUSCULOSKELETAL SYSTEM AND CONNECTIVE TISSUE: Chronic | ICD-10-CM

## 2024-11-11 DIAGNOSIS — Z87.891 PERSONAL HISTORY OF NICOTINE DEPENDENCE: ICD-10-CM

## 2024-11-11 PROCEDURE — 71271 CT THORAX LUNG CANCER SCR C-: CPT

## 2024-11-11 PROCEDURE — 71271 CT THORAX LUNG CANCER SCR C-: CPT | Mod: 26

## 2024-12-12 ENCOUNTER — APPOINTMENT (OUTPATIENT)
Dept: CARDIOLOGY | Facility: CLINIC | Age: 59
End: 2024-12-12

## 2025-01-20 ENCOUNTER — APPOINTMENT (OUTPATIENT)
Dept: CARDIOLOGY | Facility: CLINIC | Age: 60
End: 2025-01-20

## 2025-01-20 DIAGNOSIS — I70.90 UNSPECIFIED ATHEROSCLEROSIS: ICD-10-CM

## 2025-01-22 NOTE — PROGRESS NOTE ADULT - PROBLEM SELECTOR PLAN 1
C/W asa, beta-blocker, statin.  Uptitrate beta-blocker as tolerated.  OOB to chair, Ambulate daily as tolerated. Pending PT eval.  Cough and deep breathe, Incentive Spirometry Q1h, Chest PT.   C/W GI prophylaxis on protonix and DVT prophylaxis on SQ Lovenox.   Maintain Left Pleural CT --> LWS   CXR in AM   Disposition: Home when medically cleared. C/W asa, beta-blocker, statin.  Diuretics   Up-titrate beta-blocker as tolerated.  OOB to chair, Ambulate daily as tolerated.   Encouraged cough and deep breathe, Incentive Spirometry Q1h, Chest PT.   C/W GI prophylaxis on protonix and DVT prophylaxis on SQ Lovenox.   Maintain Left Pleural CT --> LWS   CXR in AM   Disposition: Home when medically cleared. .

## 2025-03-18 ENCOUNTER — NON-APPOINTMENT (OUTPATIENT)
Age: 60
End: 2025-03-18

## 2025-03-18 ENCOUNTER — APPOINTMENT (OUTPATIENT)
Dept: CARDIOLOGY | Facility: CLINIC | Age: 60
End: 2025-03-18
Payer: COMMERCIAL

## 2025-03-18 VITALS
HEART RATE: 64 BPM | BODY MASS INDEX: 27.05 KG/M2 | OXYGEN SATURATION: 99 % | WEIGHT: 205 LBS | SYSTOLIC BLOOD PRESSURE: 132 MMHG | DIASTOLIC BLOOD PRESSURE: 82 MMHG

## 2025-03-18 DIAGNOSIS — Z95.1 PRESENCE OF AORTOCORONARY BYPASS GRAFT: ICD-10-CM

## 2025-03-18 DIAGNOSIS — I25.10 ATHEROSCLEROTIC HEART DISEASE OF NATIVE CORONARY ARTERY W/OUT ANGINA PECTORIS: ICD-10-CM

## 2025-03-18 PROCEDURE — 93000 ELECTROCARDIOGRAM COMPLETE: CPT

## 2025-03-18 PROCEDURE — 99214 OFFICE O/P EST MOD 30 MIN: CPT

## 2025-03-18 PROCEDURE — G2211 COMPLEX E/M VISIT ADD ON: CPT | Mod: NC

## (undated) DEVICE — SALIVA EJECTOR (BLUE)

## (undated) DEVICE — SNARE EXACTO COLD 9MMX230CM

## (undated) DEVICE — ELCTR ECG CONDUCTIVE ADHESIVE

## (undated) DEVICE — POLY TRAP ETRAP

## (undated) DEVICE — GOWN LG

## (undated) DEVICE — BIOPSY FORCEP COLD DISP

## (undated) DEVICE — DRSG BANDAID 0.75X3"

## (undated) DEVICE — CONTAINER FORMALIN 10% 20ML

## (undated) DEVICE — BASIN EMESIS 10IN GRADUATED MAUVE

## (undated) DEVICE — CONTAINER FORMALIN 80ML YELLOW

## (undated) DEVICE — FACESHIELD FULL VISOR

## (undated) DEVICE — ELCTR GROUNDING PAD ADULT COVIDIEN

## (undated) DEVICE — BIOPSY FORCEP RADIAL JAW 4 STANDARD WITH NEEDLE

## (undated) DEVICE — PACK IV START WITH CHG

## (undated) DEVICE — TUBING MEDI-VAC W MAXIGRIP CONNECTORS 1/4"X6'

## (undated) DEVICE — TUBING IV SET GRAVITY 3Y 100" MACRO

## (undated) DEVICE — TUBING SUCTION NONCONDUCTIVE 6MM X 12FT

## (undated) DEVICE — LINE BREATHE SAMPLNG

## (undated) DEVICE — DRSG CURITY GAUZE SPONGE 4 X 4" 12-PLY NON-STERILE

## (undated) DEVICE — DRSG 2X2

## (undated) DEVICE — LUBRICATING JELLY HR ONE SHOT 3G

## (undated) DEVICE — CATH IV SAFE BC 22G X 1" (BLUE)